# Patient Record
Sex: MALE | Race: WHITE | NOT HISPANIC OR LATINO | Employment: OTHER | URBAN - METROPOLITAN AREA
[De-identification: names, ages, dates, MRNs, and addresses within clinical notes are randomized per-mention and may not be internally consistent; named-entity substitution may affect disease eponyms.]

---

## 2017-05-01 ENCOUNTER — OFFICE VISIT (OUTPATIENT)
Dept: ORTHOPEDIC SURGERY | Facility: CLINIC | Age: 82
End: 2017-05-01

## 2017-05-01 VITALS
HEART RATE: 72 BPM | DIASTOLIC BLOOD PRESSURE: 91 MMHG | HEIGHT: 74 IN | SYSTOLIC BLOOD PRESSURE: 149 MMHG | WEIGHT: 205 LBS | BODY MASS INDEX: 26.31 KG/M2

## 2017-05-01 DIAGNOSIS — S52.572A OTHER CLOSED INTRA-ARTICULAR FRACTURE OF DISTAL END OF LEFT RADIUS, INITIAL ENCOUNTER: Primary | ICD-10-CM

## 2017-05-01 PROCEDURE — 99203 OFFICE O/P NEW LOW 30 MIN: CPT | Performed by: ORTHOPAEDIC SURGERY

## 2017-05-01 RX ORDER — HYDROCODONE BITARTRATE AND ACETAMINOPHEN 7.5; 325 MG/1; MG/1
1 TABLET ORAL EVERY 6 HOURS PRN
Qty: 24 TABLET | Refills: 0 | Status: SHIPPED | OUTPATIENT
Start: 2017-05-01 | End: 2017-05-05

## 2017-05-02 ENCOUNTER — PREP FOR SURGERY (OUTPATIENT)
Dept: OTHER | Facility: HOSPITAL | Age: 82
End: 2017-05-02

## 2017-05-02 ENCOUNTER — PREP FOR SURGERY (OUTPATIENT)
Dept: ORTHOPEDIC SURGERY | Facility: CLINIC | Age: 82
End: 2017-05-02

## 2017-05-02 ENCOUNTER — TELEPHONE (OUTPATIENT)
Dept: ORTHOPEDIC SURGERY | Facility: CLINIC | Age: 82
End: 2017-05-02

## 2017-05-02 ENCOUNTER — APPOINTMENT (OUTPATIENT)
Dept: PREADMISSION TESTING | Facility: HOSPITAL | Age: 82
End: 2017-05-02

## 2017-05-02 ENCOUNTER — HOSPITAL ENCOUNTER (OUTPATIENT)
Dept: GENERAL RADIOLOGY | Facility: HOSPITAL | Age: 82
Discharge: HOME | End: 2017-05-02
Admitting: ORTHOPAEDIC SURGERY

## 2017-05-02 VITALS
BODY MASS INDEX: 26.64 KG/M2 | RESPIRATION RATE: 16 BRPM | DIASTOLIC BLOOD PRESSURE: 73 MMHG | OXYGEN SATURATION: 98 % | WEIGHT: 207.6 LBS | SYSTOLIC BLOOD PRESSURE: 160 MMHG | HEART RATE: 56 BPM | HEIGHT: 74 IN

## 2017-05-02 DIAGNOSIS — S52.572A OTHER CLOSED INTRA-ARTICULAR FRACTURE OF DISTAL END OF LEFT RADIUS, INITIAL ENCOUNTER: Primary | ICD-10-CM

## 2017-05-02 PROCEDURE — 93005 ELECTROCARDIOGRAM TRACING: CPT | Performed by: ORTHOPAEDIC SURGERY

## 2017-05-02 PROCEDURE — 85025 COMPLETE CBC W/AUTO DIFF WBC: CPT | Performed by: ORTHOPAEDIC SURGERY

## 2017-05-02 PROCEDURE — 80048 BASIC METABOLIC PNL TOTAL CA: CPT | Performed by: ORTHOPAEDIC SURGERY

## 2017-05-02 PROCEDURE — 71020 HC CHEST PA AND LATERAL: CPT

## 2017-05-02 RX ORDER — OXYCODONE HCL 10 MG/1
10 TABLET, FILM COATED, EXTENDED RELEASE ORAL ONCE
Status: CANCELLED | OUTPATIENT
Start: 2017-05-03

## 2017-05-02 RX ORDER — MELOXICAM 7.5 MG/1
15 TABLET ORAL ONCE
Status: CANCELLED | OUTPATIENT
Start: 2017-05-03

## 2017-05-02 RX ORDER — PREGABALIN 75 MG/1
150 CAPSULE ORAL ONCE
Status: CANCELLED | OUTPATIENT
Start: 2017-05-03

## 2017-05-02 RX ORDER — ACETAMINOPHEN 10 MG/ML
1000 INJECTION, SOLUTION INTRAVENOUS ONCE
Status: CANCELLED | OUTPATIENT
Start: 2017-05-03

## 2017-05-05 ENCOUNTER — OFFICE VISIT (OUTPATIENT)
Dept: CARDIOLOGY | Facility: CLINIC | Age: 82
End: 2017-05-05

## 2017-05-05 ENCOUNTER — TELEPHONE (OUTPATIENT)
Dept: CARDIOLOGY | Facility: CLINIC | Age: 82
End: 2017-05-05

## 2017-05-05 VITALS
DIASTOLIC BLOOD PRESSURE: 78 MMHG | HEART RATE: 72 BPM | WEIGHT: 207.4 LBS | SYSTOLIC BLOOD PRESSURE: 142 MMHG | HEIGHT: 74 IN | BODY MASS INDEX: 26.62 KG/M2

## 2017-05-05 DIAGNOSIS — Z01.810 ENCOUNTER FOR PRE-OPERATIVE CARDIOVASCULAR CLEARANCE: Primary | ICD-10-CM

## 2017-05-05 DIAGNOSIS — I44.7 LEFT BUNDLE BRANCH BLOCK: ICD-10-CM

## 2017-05-05 DIAGNOSIS — R94.31 ABNORMAL EKG: ICD-10-CM

## 2017-05-05 PROCEDURE — 93000 ELECTROCARDIOGRAM COMPLETE: CPT | Performed by: NURSE PRACTITIONER

## 2017-05-05 PROCEDURE — 99203 OFFICE O/P NEW LOW 30 MIN: CPT | Performed by: NURSE PRACTITIONER

## 2017-05-08 DIAGNOSIS — Z01.810 ENCOUNTER FOR PRE-OPERATIVE CARDIOVASCULAR CLEARANCE: Primary | ICD-10-CM

## 2017-05-09 ENCOUNTER — TELEPHONE (OUTPATIENT)
Dept: ORTHOPEDIC SURGERY | Facility: CLINIC | Age: 82
End: 2017-05-09

## 2017-05-11 ENCOUNTER — TELEPHONE (OUTPATIENT)
Dept: ORTHOPEDIC SURGERY | Facility: CLINIC | Age: 82
End: 2017-05-11

## 2017-05-12 ENCOUNTER — HOSPITAL ENCOUNTER (OUTPATIENT)
Dept: CARDIOLOGY | Facility: HOSPITAL | Age: 82
Discharge: HOME | End: 2017-05-12
Attending: ORTHOPAEDIC SURGERY

## 2017-05-12 ENCOUNTER — HOSPITAL ENCOUNTER (OUTPATIENT)
Dept: NUCLEAR MEDICINE | Facility: HOSPITAL | Age: 82
Discharge: HOME | End: 2017-05-12
Attending: ORTHOPAEDIC SURGERY

## 2017-05-12 ENCOUNTER — HOSPITAL ENCOUNTER (OUTPATIENT)
Dept: CARDIOLOGY | Facility: HOSPITAL | Age: 82
Discharge: HOME | End: 2017-05-12
Attending: ORTHOPAEDIC SURGERY | Admitting: ORTHOPAEDIC SURGERY

## 2017-05-12 VITALS
WEIGHT: 207 LBS | OXYGEN SATURATION: 95 % | HEIGHT: 74 IN | SYSTOLIC BLOOD PRESSURE: 158 MMHG | RESPIRATION RATE: 18 BRPM | DIASTOLIC BLOOD PRESSURE: 74 MMHG | HEART RATE: 61 BPM | BODY MASS INDEX: 26.56 KG/M2

## 2017-05-12 VITALS
HEART RATE: 50 BPM | WEIGHT: 207 LBS | SYSTOLIC BLOOD PRESSURE: 154 MMHG | DIASTOLIC BLOOD PRESSURE: 50 MMHG | HEIGHT: 74 IN | BODY MASS INDEX: 26.56 KG/M2

## 2017-05-12 DIAGNOSIS — Z01.810 ENCOUNTER FOR PRE-OPERATIVE CARDIOVASCULAR CLEARANCE: ICD-10-CM

## 2017-05-12 LAB
AORTIC ARCH: 3 CM
ASCENDING AORTA: 3.1 CM
BH CV ECHO MEAS - ACS: 2 CM
BH CV ECHO MEAS - AO MAX PG (FULL): 1.9 MMHG
BH CV ECHO MEAS - AO MAX PG: 4.6 MMHG
BH CV ECHO MEAS - AO MEAN PG (FULL): 2 MMHG
BH CV ECHO MEAS - AO MEAN PG: 3 MMHG
BH CV ECHO MEAS - AO ROOT AREA (BSA CORRECTED): 1.4
BH CV ECHO MEAS - AO ROOT AREA: 7.5 CM^2
BH CV ECHO MEAS - AO ROOT DIAM: 3.1 CM
BH CV ECHO MEAS - AO V2 MAX: 107 CM/SEC
BH CV ECHO MEAS - AO V2 MEAN: 79.3 CM/SEC
BH CV ECHO MEAS - AO V2 VTI: 26.5 CM
BH CV ECHO MEAS - ASC AORTA: 3.1 CM
BH CV ECHO MEAS - AVA(I,A): 2.2 CM^2
BH CV ECHO MEAS - AVA(I,D): 2.2 CM^2
BH CV ECHO MEAS - AVA(V,A): 2.4 CM^2
BH CV ECHO MEAS - AVA(V,D): 2.4 CM^2
BH CV ECHO MEAS - BSA(HAYCOCK): 2.2 M^2
BH CV ECHO MEAS - BSA: 2.2 M^2
BH CV ECHO MEAS - BZI_BMI: 26.6 KILOGRAMS/M^2
BH CV ECHO MEAS - BZI_METRIC_HEIGHT: 188 CM
BH CV ECHO MEAS - BZI_METRIC_WEIGHT: 93.9 KG
BH CV ECHO MEAS - CONTRAST EF (2CH): 50.1 ML/M^2
BH CV ECHO MEAS - CONTRAST EF 4CH: 42.2 ML/M^2
BH CV ECHO MEAS - EDV(CUBED): 67.4 ML
BH CV ECHO MEAS - EDV(MOD-SP2): 139 ML
BH CV ECHO MEAS - EDV(MOD-SP4): 140 ML
BH CV ECHO MEAS - EDV(TEICH): 72.9 ML
BH CV ECHO MEAS - EF(CUBED): 71.4 %
BH CV ECHO MEAS - EF(MOD-SP2): 50.1 %
BH CV ECHO MEAS - EF(MOD-SP4): 42.2 %
BH CV ECHO MEAS - EF(TEICH): 63.6 %
BH CV ECHO MEAS - ESV(CUBED): 19.2 ML
BH CV ECHO MEAS - ESV(MOD-SP2): 69.3 ML
BH CV ECHO MEAS - ESV(MOD-SP4): 80.9 ML
BH CV ECHO MEAS - ESV(TEICH): 26.5 ML
BH CV ECHO MEAS - FS: 34.2 %
BH CV ECHO MEAS - IVS/LVPW: 1.2
BH CV ECHO MEAS - IVSD: 0.99 CM
BH CV ECHO MEAS - LAT PEAK E' VEL: 5 CM/SEC
BH CV ECHO MEAS - LV DIASTOLIC VOL/BSA (35-75): 63.5 ML/M^2
BH CV ECHO MEAS - LV MASS(C)D: 117.7 GRAMS
BH CV ECHO MEAS - LV MASS(C)DI: 53.4 GRAMS/M^2
BH CV ECHO MEAS - LV MAX PG: 2.6 MMHG
BH CV ECHO MEAS - LV MEAN PG: 1 MMHG
BH CV ECHO MEAS - LV SYSTOLIC VOL/BSA (12-30): 36.7 ML/M^2
BH CV ECHO MEAS - LV V1 MAX: 81.3 CM/SEC
BH CV ECHO MEAS - LV V1 MEAN: 53.8 CM/SEC
BH CV ECHO MEAS - LV V1 VTI: 18.4 CM
BH CV ECHO MEAS - LVIDD: 4.1 CM
BH CV ECHO MEAS - LVIDS: 2.7 CM
BH CV ECHO MEAS - LVLD AP2: 10.2 CM
BH CV ECHO MEAS - LVLD AP4: 10.5 CM
BH CV ECHO MEAS - LVLS AP2: 9.1 CM
BH CV ECHO MEAS - LVLS AP4: 8.8 CM
BH CV ECHO MEAS - LVOT AREA (M): 3.1 CM^2
BH CV ECHO MEAS - LVOT AREA: 3.1 CM^2
BH CV ECHO MEAS - LVOT DIAM: 2 CM
BH CV ECHO MEAS - LVPWD: 0.86 CM
BH CV ECHO MEAS - MED PEAK E' VEL: 7 CM/SEC
BH CV ECHO MEAS - MV A DUR: 0.11 SEC
BH CV ECHO MEAS - MV A MAX VEL: 113 CM/SEC
BH CV ECHO MEAS - MV DEC SLOPE: 349 CM/SEC^2
BH CV ECHO MEAS - MV DEC TIME: 0.18 SEC
BH CV ECHO MEAS - MV E MAX VEL: 45.6 CM/SEC
BH CV ECHO MEAS - MV E/A: 0.4
BH CV ECHO MEAS - MV MAX PG: 3.7 MMHG
BH CV ECHO MEAS - MV MEAN PG: 1 MMHG
BH CV ECHO MEAS - MV P1/2T MAX VEL: 61.1 CM/SEC
BH CV ECHO MEAS - MV P1/2T: 51.3 MSEC
BH CV ECHO MEAS - MV V2 MAX: 96.8 CM/SEC
BH CV ECHO MEAS - MV V2 MEAN: 36.5 CM/SEC
BH CV ECHO MEAS - MV V2 VTI: 27.4 CM
BH CV ECHO MEAS - MVA P1/2T LCG: 3.6 CM^2
BH CV ECHO MEAS - MVA(P1/2T): 4.3 CM^2
BH CV ECHO MEAS - MVA(VTI): 2.1 CM^2
BH CV ECHO MEAS - PA ACC TIME: 0.09 SEC
BH CV ECHO MEAS - PA MAX PG (FULL): 3.2 MMHG
BH CV ECHO MEAS - PA MAX PG: 4.9 MMHG
BH CV ECHO MEAS - PA PR(ACCEL): 40.8 MMHG
BH CV ECHO MEAS - PA V2 MAX: 111 CM/SEC
BH CV ECHO MEAS - PULM A REVS DUR: 0.1 SEC
BH CV ECHO MEAS - PULM A REVS VEL: 40.7 CM/SEC
BH CV ECHO MEAS - PULM DIAS VEL: 28.6 CM/SEC
BH CV ECHO MEAS - PULM S/D: 1.9
BH CV ECHO MEAS - PULM SYS VEL: 55.8 CM/SEC
BH CV ECHO MEAS - PVA(V,A): 1.7 CM^2
BH CV ECHO MEAS - PVA(V,D): 1.7 CM^2
BH CV ECHO MEAS - QP/QS: 0.69
BH CV ECHO MEAS - RAP SYSTOLE: 3 MMHG
BH CV ECHO MEAS - RV MAX PG: 1.7 MMHG
BH CV ECHO MEAS - RV MEAN PG: 1 MMHG
BH CV ECHO MEAS - RV V1 MAX: 65.8 CM/SEC
BH CV ECHO MEAS - RV V1 MEAN: 45.4 CM/SEC
BH CV ECHO MEAS - RV V1 VTI: 14 CM
BH CV ECHO MEAS - RVOT AREA: 2.8 CM^2
BH CV ECHO MEAS - RVOT DIAM: 1.9 CM
BH CV ECHO MEAS - RVSP: 36 MMHG
BH CV ECHO MEAS - SI(AO): 90.7 ML/M^2
BH CV ECHO MEAS - SI(CUBED): 21.8 ML/M^2
BH CV ECHO MEAS - SI(LVOT): 26.2 ML/M^2
BH CV ECHO MEAS - SI(MOD-SP2): 31.6 ML/M^2
BH CV ECHO MEAS - SI(MOD-SP4): 26.8 ML/M^2
BH CV ECHO MEAS - SI(TEICH): 21 ML/M^2
BH CV ECHO MEAS - SV(AO): 200 ML
BH CV ECHO MEAS - SV(CUBED): 48.2 ML
BH CV ECHO MEAS - SV(LVOT): 57.8 ML
BH CV ECHO MEAS - SV(MOD-SP2): 69.7 ML
BH CV ECHO MEAS - SV(MOD-SP4): 59.1 ML
BH CV ECHO MEAS - SV(RVOT): 39.7 ML
BH CV ECHO MEAS - SV(TEICH): 46.4 ML
BH CV ECHO MEAS - TAPSE (>1.6): 2.6 CM2
BH CV ECHO MEAS - TR MAX VEL: 289 CM/SEC
BH CV NUCLEAR PRIOR STUDY: 3
BH CV STRESS BP STAGE 1: NORMAL
BH CV STRESS BP STAGE 2: NORMAL
BH CV STRESS BP STAGE 3: NORMAL
BH CV STRESS COMMENTS STAGE 1: NORMAL
BH CV STRESS COMMENTS STAGE 2: NORMAL
BH CV STRESS DOSE REGADENOSON STAGE 1: 0.4
BH CV STRESS DURATION MIN STAGE 1: 0
BH CV STRESS DURATION MIN STAGE 2: 4
BH CV STRESS DURATION SEC STAGE 1: 15
BH CV STRESS DURATION SEC STAGE 2: 0
BH CV STRESS HR STAGE 1: 75
BH CV STRESS HR STAGE 2: 75
BH CV STRESS HR STAGE 3: 69
BH CV STRESS O2 STAGE 1: 96
BH CV STRESS O2 STAGE 2: 96
BH CV STRESS O2 STAGE 3: 96
BH CV STRESS PROTOCOL 1: NORMAL
BH CV STRESS RECOVERY BP: NORMAL MMHG
BH CV STRESS RECOVERY HR: 71 BPM
BH CV STRESS STAGE 1: 1
BH CV STRESS STAGE 2: 2
BH CV STRESS STAGE 3: 3
BH CV XLRA - RV BASE: 3.7 CM
BH CV XLRA - TDI S': 12 CM/SEC
E/E' RATIO: 8
LEFT ATRIUM VOLUME INDEX: 19 ML/M2
LV EF NUC BP: 50 %
MAXIMAL PREDICTED HEART RATE: 141 BPM
PERCENT MAX PREDICTED HR: 65.25 %
STRESS BASELINE BP: NORMAL MMHG
STRESS BASELINE HR: 59 BPM
STRESS O2 SAT REST: 95 %
STRESS PERCENT HR: 77 %
STRESS POST ESTIMATED WORKLOAD: 1 METS
STRESS POST EXERCISE DUR SEC: 30 SEC
STRESS POST PEAK BP: NORMAL MMHG
STRESS POST PEAK HR: 92 BPM
STRESS TARGET HR: 120 BPM

## 2017-05-12 PROCEDURE — 0 TECHNETIUM SESTAMIBI: Performed by: ORTHOPAEDIC SURGERY

## 2017-05-12 PROCEDURE — 93016 CV STRESS TEST SUPVJ ONLY: CPT | Performed by: INTERNAL MEDICINE

## 2017-05-12 PROCEDURE — 25010000002 REGADENOSON 0.4 MG/5ML SOLUTION: Performed by: ORTHOPAEDIC SURGERY

## 2017-05-12 PROCEDURE — A9500 TC99M SESTAMIBI: HCPCS | Performed by: ORTHOPAEDIC SURGERY

## 2017-05-12 PROCEDURE — 93306 TTE W/DOPPLER COMPLETE: CPT | Performed by: INTERNAL MEDICINE

## 2017-05-12 PROCEDURE — 78452 HT MUSCLE IMAGE SPECT MULT: CPT

## 2017-05-12 PROCEDURE — 93017 CV STRESS TEST TRACING ONLY: CPT

## 2017-05-12 PROCEDURE — 0399T ADULT TRANSTHORACIC ECHO COMPLETE WITH CONTRAST: CPT | Performed by: INTERNAL MEDICINE

## 2017-05-12 PROCEDURE — 78452 HT MUSCLE IMAGE SPECT MULT: CPT | Performed by: INTERNAL MEDICINE

## 2017-05-12 PROCEDURE — C8929 TTE W OR WO FOL WCON,DOPPLER: HCPCS

## 2017-05-12 PROCEDURE — 0399T HC MYOCARDL STRAIN IMAG QUAN ASSMT PER SESS: CPT

## 2017-05-12 PROCEDURE — 93018 CV STRESS TEST I&R ONLY: CPT | Performed by: INTERNAL MEDICINE

## 2017-05-12 PROCEDURE — 25010000002 PERFLUTREN (DEFINITY) 8.476 MG IN SODIUM CHLORIDE 10 ML INJECTION: Performed by: ORTHOPAEDIC SURGERY

## 2017-05-12 RX ADMIN — Medication 1 DOSE: at 07:50

## 2017-05-12 RX ADMIN — Medication 1 DOSE: at 10:00

## 2017-05-12 RX ADMIN — REGADENOSON 0.4 MG: 0.08 INJECTION, SOLUTION INTRAVENOUS at 10:00

## 2017-05-12 RX ADMIN — PERFLUTREN 3 ML: 6.52 INJECTION, SUSPENSION INTRAVENOUS at 11:32

## 2017-05-15 ENCOUNTER — PREP FOR SURGERY (OUTPATIENT)
Dept: ORTHOPEDIC SURGERY | Facility: CLINIC | Age: 82
End: 2017-05-15

## 2017-07-13 ENCOUNTER — TELEPHONE (OUTPATIENT)
Dept: CARDIOLOGY | Facility: CLINIC | Age: 82
End: 2017-07-13

## 2017-07-13 NOTE — TELEPHONE ENCOUNTER
----- Message from YESI Escalante sent at 5/19/2017  5:34 PM EDT -----    Remind patient to follow-up with Dr. Shaquille Munoz

## 2017-07-13 NOTE — TELEPHONE ENCOUNTER
Mr. Asher has been recommended to follow-up with Dr. Shaquille Munoz for abnormal stress test in the past.  The patient was called on 5/18/17 to make a follow-up appointment Dr. Shaquille Munoz at the [facility] and declined.    Ivon-please call patient one more time and recommend that he be seen in the office by Dr. Shaquille Munoz and please schedule appointment.  Thank you

## 2018-10-23 ENCOUNTER — APPOINTMENT (OUTPATIENT)
Dept: GENERAL RADIOLOGY | Facility: HOSPITAL | Age: 83
End: 2018-10-23

## 2018-10-23 ENCOUNTER — HOSPITAL ENCOUNTER (EMERGENCY)
Facility: HOSPITAL | Age: 83
Discharge: HOME | End: 2018-10-23
Attending: EMERGENCY MEDICINE | Admitting: EMERGENCY MEDICINE

## 2018-10-23 VITALS
SYSTOLIC BLOOD PRESSURE: 134 MMHG | OXYGEN SATURATION: 99 % | RESPIRATION RATE: 18 BRPM | HEART RATE: 74 BPM | HEIGHT: 74 IN | WEIGHT: 202 LBS | BODY MASS INDEX: 25.93 KG/M2 | DIASTOLIC BLOOD PRESSURE: 72 MMHG | TEMPERATURE: 98.4 F

## 2018-10-23 DIAGNOSIS — S52.614A CLOSED NONDISPLACED FRACTURE OF STYLOID PROCESS OF RIGHT ULNA, INITIAL ENCOUNTER: Primary | ICD-10-CM

## 2018-10-23 PROCEDURE — 73110 X-RAY EXAM OF WRIST: CPT

## 2018-10-23 PROCEDURE — 99283 EMERGENCY DEPT VISIT LOW MDM: CPT

## 2018-10-23 PROCEDURE — 99284 EMERGENCY DEPT VISIT MOD MDM: CPT | Performed by: EMERGENCY MEDICINE

## 2018-10-23 NOTE — ED PROVIDER NOTES
Subjective     History provided by:  Patient and spouse    History of Present Illness    · Chief complaint: Wrist injury    · Location: Right wrist    · Quality/Severity: Swelling and pain involving the right wrist.    · Timing/Onset: The patient fell when he got out of bed [place] night 36 hours ago.    · Modifying Factors: Movement of his right wrist exacerbates pain.    · Associated symptoms: He has swelling of the right wrist and hand.    · Narrative: The patient is an 82-year-old white male who states he got out of bed [place] night and was sleepy and stumbled and fell injuring his right wrist.  It continues to be swollen and painful, so his wife made him come in.    ED Triage Vitals   Temp Heart Rate Resp BP SpO2   10/23/18 1427 10/23/18 1427 10/23/18 1427 10/23/18 1427 10/23/18 1427   98.4 °F (36.9 °C) 71 18 166/81 96 %      Temp src Heart Rate Source Patient Position BP Location FiO2 (%)   10/23/18 1609 10/23/18 1609 10/23/18 1609 10/23/18 1609 --   Oral Monitor Lying Right arm        Review of Systems   Constitutional: Negative for activity change, appetite change, chills, diaphoresis, fatigue and fever.   HENT: Negative for congestion, dental problem, ear pain, hearing loss, mouth sores, postnasal drip, rhinorrhea, sinus pressure, sore throat and voice change.    Eyes: Negative for photophobia, pain, discharge, redness and visual disturbance.   Respiratory: Negative for cough, chest tightness, shortness of breath, wheezing and stridor.    Cardiovascular: Negative for chest pain, palpitations and leg swelling.   Gastrointestinal: Negative for abdominal pain, diarrhea, nausea and vomiting.   Genitourinary: Negative for difficulty urinating, dysuria, flank pain, frequency, hematuria and urgency.   Musculoskeletal: Negative for arthralgias, back pain, gait problem, joint swelling, myalgias, neck pain and neck stiffness.   Skin: Negative for color change and rash.   Neurological: Negative for dizziness, tremors,  seizures, syncope, facial asymmetry, speech difficulty, weakness, light-headedness, numbness and headaches.   Hematological: Negative for adenopathy.   Psychiatric/Behavioral: Negative.  Negative for confusion and decreased concentration. The patient is not nervous/anxious.        Past Medical History:   Diagnosis Date   • Fracture of wrist     left, sched ORIF   • Whooping cough     as a child    • Wrist fracture        No Known Allergies    Past Surgical History:   Procedure Laterality Date   • FRACTURE SURGERY     • NO PAST SURGERIES         Family History   Problem Relation Age of Onset   • Cancer Father        Social History     Social History   • Marital status:      Social History Main Topics   • Smoking status: Former Smoker     Packs/day: 0.25     Types: Cigarettes     Quit date: 1967   • Smokeless tobacco: Never Used   • Alcohol use 1.8 - 2.4 oz/week     3 - 4 Cans of beer per week      Comment: PER WEEK    • Drug use: No   • Sexual activity: Defer     Other Topics Concern   • Not on file           Objective   Physical Exam   Constitutional: He is oriented to person, place, and time. He appears well-developed and well-nourished. No distress.   The patient appears healthy in no acute distress.  Review of his vital signs: His blood pressure slightly elevated 166/81, otherwise vital signs within normal limits.   HENT:   Head: Normocephalic and atraumatic.   Eyes: Pupils are equal, round, and reactive to light. Conjunctivae are normal.   Neck: Normal range of motion.   Musculoskeletal:   The patient's right wrist is swollen with ecchymosis and mild diffuse tenderness especially over the ulnar styloid.  There is no stock box tenderness.  No obvious deformity of the wrist.  Range of motion of the wrist causes discomfort.  He has swelling over the dorsal aspect of the right hand, there is no bony tenderness or deformity of the metacarpals of the right hand.  He has full range of motion in all of his  fingers which are neurovascularly intact and appear atraumatic.   Neurological: He is alert and oriented to person, place, and time. No cranial nerve deficit.   No focal motor sensory deficit.   Skin: Skin is warm and dry. Capillary refill takes less than 2 seconds. No rash noted. He is not diaphoretic. No erythema.   Psychiatric: He has a normal mood and affect. His behavior is normal. Judgment and thought content normal.   Nursing note and vitals reviewed.      Procedures           ED Course  ED Course as of Oct 23 1741   Tue Oct 23, 2018   1522 [facility] Report [id number] is blank  [TP]   1739 The patient has a nondisplaced ulnar styloid fracture of the right wrist, otherwise it was negative for fracture.  [TP]   1740 I placed the patient's right wrist, hand, and forearm and an ulnar gutter splint using 6 inch Ortho-Glass.  His right hand was neurovascularly intact after placement.  [TP]   1740 The patient requested to be referred to [facility].  [TP]      ED Course User Index  [TP] Adolfo Fleming MD                  MDM  Number of Diagnoses or Management Options  Closed nondisplaced fracture of styloid process of right ulna, initial encounter: new and requires workup     Amount and/or Complexity of Data Reviewed  Tests in the radiology section of CPT®: ordered and reviewed  Independent visualization of images, tracings, or specimens: yes    Patient Progress  Patient progress: stable        Final diagnoses:   Closed nondisplaced fracture of styloid process of right ulna, initial encounter           Labs Reviewed - No data to display  XR Wrist 3+ View Right   ED Interpretation   The patient is a nondisplaced ulnar styloid fracture of the right wrist.  I see no other fractures.      Final Result   Nondisplaced ulnar styloid fracture.       This report was finalized on 10/23/2018 2:58 PM by Dr. Solis Johnson MD.                 Medication List      No changes were made to your prescriptions  during this visit.            Adolfo Fleming MD  10/23/18 [id number]

## 2019-04-24 ENCOUNTER — HOSPITAL ENCOUNTER (OUTPATIENT)
Dept: GENERAL RADIOLOGY | Facility: HOSPITAL | Age: 84
Discharge: HOME | End: 2019-04-24

## 2019-04-24 ENCOUNTER — HOSPITAL ENCOUNTER (OUTPATIENT)
Dept: GENERAL RADIOLOGY | Facility: HOSPITAL | Age: 84
Discharge: HOME | End: 2019-04-24
Admitting: ORTHOPAEDIC SURGERY

## 2019-04-24 ENCOUNTER — APPOINTMENT (OUTPATIENT)
Dept: PREADMISSION TESTING | Facility: HOSPITAL | Age: 84
End: 2019-04-24

## 2019-04-24 VITALS
HEIGHT: 74 IN | SYSTOLIC BLOOD PRESSURE: 157 MMHG | DIASTOLIC BLOOD PRESSURE: 74 MMHG | TEMPERATURE: 98.5 F | RESPIRATION RATE: 16 BRPM | WEIGHT: 205.8 LBS | BODY MASS INDEX: 26.41 KG/M2 | OXYGEN SATURATION: 98 % | HEART RATE: 61 BPM

## 2019-04-24 LAB
ABO GROUP BLD: NORMAL
ALBUMIN SERPL-MCNC: 4.4 G/DL (ref 3.5–5.2)
ALBUMIN/GLOB SERPL: 1.5 G/DL
ALP SERPL-CCNC: 77 U/L (ref 39–117)
ALT SERPL W P-5'-P-CCNC: 17 U/L (ref 1–41)
ANION GAP SERPL CALCULATED.3IONS-SCNC: 14.1 MMOL/L
APTT PPP: 29.5 SECONDS (ref 22.7–35.4)
AST SERPL-CCNC: 27 U/L (ref 1–40)
BACTERIA UR QL AUTO: NORMAL /HPF
BASOPHILS # BLD AUTO: 0.07 10*3/MM3 (ref 0–0.2)
BASOPHILS NFR BLD AUTO: 1.1 % (ref 0–1.5)
BILIRUB SERPL-MCNC: 0.7 MG/DL (ref 0.2–1.2)
BILIRUB UR QL STRIP: NEGATIVE
BLD GP AB SCN SERPL QL: NEGATIVE
BUN BLD-MCNC: 18 MG/DL (ref 8–23)
BUN/CREAT SERPL: 17.5 (ref 7–25)
CALCIUM SPEC-SCNC: 9 MG/DL (ref 8.6–10.5)
CHLORIDE SERPL-SCNC: 100 MMOL/L (ref 98–107)
CLARITY UR: CLEAR
CO2 SERPL-SCNC: 25.9 MMOL/L (ref 22–29)
COLOR UR: YELLOW
CREAT BLD-MCNC: 1.03 MG/DL (ref 0.76–1.27)
DEPRECATED RDW RBC AUTO: 45.2 FL (ref 37–54)
EOSINOPHIL # BLD AUTO: 0.23 10*3/MM3 (ref 0–0.4)
EOSINOPHIL NFR BLD AUTO: 3.7 % (ref 0.3–6.2)
ERYTHROCYTE [DISTWIDTH] IN BLOOD BY AUTOMATED COUNT: 13.1 % (ref 12.3–15.4)
GFR SERPL CREATININE-BSD FRML MDRD: 69 ML/MIN/1.73
GLOBULIN UR ELPH-MCNC: 2.9 GM/DL
GLUCOSE BLD-MCNC: 99 MG/DL (ref 65–99)
GLUCOSE UR STRIP-MCNC: NEGATIVE MG/DL
HCT VFR BLD AUTO: 45.8 % (ref 37.5–51)
HGB BLD-MCNC: 14.9 G/DL (ref 13–17.7)
HGB UR QL STRIP.AUTO: NEGATIVE
HYALINE CASTS UR QL AUTO: NORMAL /LPF
IMM GRANULOCYTES # BLD AUTO: 0.02 10*3/MM3 (ref 0–0.05)
IMM GRANULOCYTES NFR BLD AUTO: 0.3 % (ref 0–0.5)
INR PPP: 1.06 (ref 0.9–1.1)
KETONES UR QL STRIP: NEGATIVE
LEUKOCYTE ESTERASE UR QL STRIP.AUTO: NEGATIVE
LYMPHOCYTES # BLD AUTO: 1.72 10*3/MM3 (ref 0.7–3.1)
LYMPHOCYTES NFR BLD AUTO: 27.3 % (ref 19.6–45.3)
MCH RBC QN AUTO: 30.5 PG (ref 26.6–33)
MCHC RBC AUTO-ENTMCNC: 32.5 G/DL (ref 31.5–35.7)
MCV RBC AUTO: 93.9 FL (ref 79–97)
MONOCYTES # BLD AUTO: 0.72 10*3/MM3 (ref 0.1–0.9)
MONOCYTES NFR BLD AUTO: 11.4 % (ref 5–12)
NEUTROPHILS # BLD AUTO: 3.54 10*3/MM3 (ref 1.7–7)
NEUTROPHILS NFR BLD AUTO: 56.2 % (ref 42.7–76)
NITRITE UR QL STRIP: NEGATIVE
NRBC BLD AUTO-RTO: 0 /100 WBC (ref 0–0.2)
PH UR STRIP.AUTO: 5.5 [PH] (ref 5–8)
PLATELET # BLD AUTO: 225 10*3/MM3 (ref 140–450)
PMV BLD AUTO: 9.7 FL (ref 6–12)
POTASSIUM BLD-SCNC: 4 MMOL/L (ref 3.5–5.2)
PROT SERPL-MCNC: 7.3 G/DL (ref 6–8.5)
PROT UR QL STRIP: NEGATIVE
PROTHROMBIN TIME: 13.5 SECONDS (ref 11.7–14.2)
RBC # BLD AUTO: 4.88 10*6/MM3 (ref 4.14–5.8)
RBC # UR: NORMAL /HPF
REF LAB TEST METHOD: NORMAL
RH BLD: POSITIVE
SODIUM BLD-SCNC: 140 MMOL/L (ref 136–145)
SP GR UR STRIP: 1.02 (ref 1–1.03)
SQUAMOUS #/AREA URNS HPF: NORMAL /HPF
T&S EXPIRATION DATE: NORMAL
UROBILINOGEN UR QL STRIP: NORMAL
WBC NRBC COR # BLD: 6.3 10*3/MM3 (ref 3.4–10.8)
WBC UR QL AUTO: NORMAL /HPF

## 2019-04-24 PROCEDURE — 81001 URINALYSIS AUTO W/SCOPE: CPT | Performed by: ORTHOPAEDIC SURGERY

## 2019-04-24 PROCEDURE — 73502 X-RAY EXAM HIP UNI 2-3 VIEWS: CPT

## 2019-04-24 PROCEDURE — 85025 COMPLETE CBC W/AUTO DIFF WBC: CPT | Performed by: ORTHOPAEDIC SURGERY

## 2019-04-24 PROCEDURE — 93005 ELECTROCARDIOGRAM TRACING: CPT

## 2019-04-24 PROCEDURE — 80053 COMPREHEN METABOLIC PANEL: CPT | Performed by: ORTHOPAEDIC SURGERY

## 2019-04-24 PROCEDURE — 85610 PROTHROMBIN TIME: CPT | Performed by: ORTHOPAEDIC SURGERY

## 2019-04-24 PROCEDURE — 71046 X-RAY EXAM CHEST 2 VIEWS: CPT

## 2019-04-24 PROCEDURE — 85730 THROMBOPLASTIN TIME PARTIAL: CPT | Performed by: ORTHOPAEDIC SURGERY

## 2019-04-24 PROCEDURE — 86900 BLOOD TYPING SEROLOGIC ABO: CPT | Performed by: ORTHOPAEDIC SURGERY

## 2019-04-24 PROCEDURE — 86901 BLOOD TYPING SEROLOGIC RH(D): CPT | Performed by: ORTHOPAEDIC SURGERY

## 2019-04-24 PROCEDURE — 93010 ELECTROCARDIOGRAM REPORT: CPT | Performed by: INTERNAL MEDICINE

## 2019-04-24 PROCEDURE — 36415 COLL VENOUS BLD VENIPUNCTURE: CPT

## 2019-04-24 PROCEDURE — 86850 RBC ANTIBODY SCREEN: CPT | Performed by: ORTHOPAEDIC SURGERY

## 2019-04-24 RX ORDER — NAPROXEN SODIUM 220 MG
220 TABLET ORAL 2 TIMES DAILY PRN
COMMUNITY

## 2019-04-24 ASSESSMENT — HOOS JR
HOOS JR SCORE: 52.965
HOOS JR SCORE: 12

## 2019-04-24 NOTE — DISCHARGE INSTRUCTIONS
Take the following medications the morning of surgery with a small sip of water:  NONE    PLEASE ARRIVE AT THE HOSPITAL AT THE TIME PROVIDED TO YOU BY DR. GALVEZ'S OFFICE    General Instructions:  • Do not eat solid food after midnight the night before surgery.  • You may drink clear liquids day of surgery but must stop at least one hour before your hospital arrival time.  • It is beneficial for you to have a clear drink that contains carbohydrates the day of surgery.  We suggest a 12 to 20 ounce bottle of Gatorade or Powerade for non-diabetic patients or a 12 to 20 ounce bottle of G2 or Powerade Zero for diabetic patients. (Pediatric patients, are not advised to drink a 12 to 20 ounce carbohydrate drink)    Clear liquids are liquids you can see through.  Nothing red in color.     Plain water                               Sports drinks  Sodas                                   Gelatin (Jell-O)  Fruit juices without pulp such as white grape juice and apple juice  Popsicles that contain no fruit or yogurt  Tea or coffee (no cream or milk added)  Gatorade / Powerade  G2 / Powerade Zero    • Infants may have breast milk up to four hours before surgery.  • Infants drinking formula may drink formula up to six hours before surgery.   • Patients who avoid smoking, chewing tobacco and alcohol for 4 weeks prior to surgery have a reduced risk of post-operative complications.  Quit smoking as many days before surgery as you can.  • Do not smoke, use chewing tobacco or drink alcohol the day of surgery.   • If applicable bring your C-PAP/ BI-PAP machine.  • Bring any papers given to you in the doctor’s office.  • Wear clean comfortable clothes and socks.  • Do not wear contact lenses, false eyelashes or make-up.  Bring a case for your glasses.   • Bring crutches or walker if applicable.  • Remove all piercings.  Leave jewelry and any other valuables at home.  • Hair extensions with metal clips must be removed prior to  surgery.  • The Pre-Admission Testing nurse will instruct you to bring medications if unable to obtain an accurate list in Pre-Admission Testing.        If you were given a blood bank ID arm band remember to bring it with you the day of surgery.    Preventing a Surgical Site Infection:  • For 2 to 3 days before surgery, avoid shaving with a razor because the razor can irritate skin and make it easier to develop an infection.    • Any areas of open skin can increase the risk of a post-operative wound infection by allowing bacteria to enter and travel throughout the body.  Notify your surgeon if you have any skin wounds / rashes even if it is not near the expected surgical site.  The area will need assessed to determine if surgery should be delayed until it is healed.  • The night prior to surgery sleep in a clean bed with clean clothing.  Do not allow pets to sleep with you.  • Shower on the morning of surgery using a fresh bar of anti-bacterial soap (such as Dial) and clean washcloth.  Dry with a clean towel and dress in clean clothing.  • Ask your surgeon if you will be receiving antibiotics prior to surgery.  • Make sure you, your family, and all healthcare providers clean their hands with soap and water or an alcohol based hand  before caring for you or your wound.    Day of surgery:  Upon arrival, a Pre-op nurse and Anesthesiologist will review your health history, obtain vital signs, and answer questions you may have.  The only belongings needed at this time will be your home medications and if applicable your C-PAP/BI-PAP machine.  If you are staying overnight your family can leave the rest of your belongings in the car and bring them to your room later.  A Pre-op nurse will start an IV and you may receive medication in preparation for surgery, including something to help you relax.  Your family will be able to see you in the Pre-op area.  While you are in surgery your family should notify the waiting  room  if they leave the waiting room area and provide a contact phone number.        2% CHLORAHEXIDINE GLUCONATE* CLOTH  Preparing or “prepping” skin before surgery can reduce the risk of infection at the surgical site. To make the process easier, [facility] has chosen disposable cloths moistened with a rinse-free, 2% Chlorhexidine Gluconate (CHG) antiseptic solution. The steps below outline the prepping process and should be carefully followed.        Use the prep cloth on the area that is circled in the diagram             Directions Night before Surgery  1) Shower using a fresh bar of anti-bacterial soap (such as Dial) and clean washcloth.  Use a clean towel to completely dry your skin.  2) Do not use any lotions, oils or creams on your skin.  3) Open the package and remove 1 cloth, wipe your skin for 30 seconds in a circular motion.  Allow to dry for 3 minutes.  4) Repeat #3 with second cloth.  5) Do not touch your eyes, ears, or mouth with the prep cloth.  6) Allow the wet prep solution to air dry.  7) Discard the prep cloth and wash your hands with soap and water.   8) Dress in clean bed clothes and sleep on fresh clean bed sheets.   9) You may experience some temporary itching after the prep.    Directions Day of Surgery  1) Repeat steps 1,2,3,4,5,6,7, and 9.   2) Dress in clean clothes before coming to the hospital.    BACTROBAN NASAL OINTMENT  There are many germs normally in your nose. Bactroban is an ointment that will help reduce these germs. Please follow these instructions for Bactroban use:      ____The day before surgery in the morning  Date________    ____The day before surgery in the evening              Date________    ____The day of surgery in the morning    Date________    **Squirt ½ package of Bactroban Ointment onto a cotton applicator and apply to inside of 1st nostril.  Squirt the remaining Bactroban and apply to the inside of the other nostril.    Please be aware  that surgery does come with discomfort.  We want to make every effort to control your discomfort so please discuss any uncontrolled symptoms with your nurse.   Your doctor will most likely have prescribed pain medications.      If you are going home after surgery you will receive individualized written care instructions before being discharged.  A responsible adult must drive you to and from the hospital on the day of your surgery and stay with you for 24 hours.    If you are staying overnight following surgery, you will be transported to your hospital room following the recovery period.  [facility] has all private rooms.    You have received a list of surgical assistants for your reference.  If you have any questions please call Pre-Admission Testing at [phone].  Deductibles and co-payments are collected on the day of service. Please be prepared to pay the required co-pay, deductible or deposit on the day of service as defined by your plan.

## 2019-04-29 ENCOUNTER — ANESTHESIA EVENT (OUTPATIENT)
Dept: PERIOP | Facility: HOSPITAL | Age: 84
End: 2019-04-29

## 2019-04-29 ENCOUNTER — APPOINTMENT (OUTPATIENT)
Dept: GENERAL RADIOLOGY | Facility: HOSPITAL | Age: 84
End: 2019-04-29

## 2019-04-29 ENCOUNTER — ANESTHESIA (OUTPATIENT)
Dept: PERIOP | Facility: HOSPITAL | Age: 84
End: 2019-04-29

## 2019-04-29 ENCOUNTER — HOSPITAL ENCOUNTER (INPATIENT)
Facility: HOSPITAL | Age: 84
LOS: 2 days | Discharge: HOME HEALTH | End: 2019-05-01
Attending: ORTHOPAEDIC SURGERY | Admitting: ORTHOPAEDIC SURGERY

## 2019-04-29 DIAGNOSIS — M16.12 PRIMARY OSTEOARTHRITIS OF LEFT HIP: ICD-10-CM

## 2019-04-29 DIAGNOSIS — R26.2 DIFFICULTY WALKING: Primary | ICD-10-CM

## 2019-04-29 PROBLEM — M16.9 DEGENERATIVE JOINT DISEASE (DJD) OF HIP: Status: ACTIVE | Noted: 2019-04-29

## 2019-04-29 PROCEDURE — C1776 JOINT DEVICE (IMPLANTABLE): HCPCS | Performed by: ORTHOPAEDIC SURGERY

## 2019-04-29 PROCEDURE — 25010000002 ONDANSETRON PER 1 MG: Performed by: NURSE ANESTHETIST, CERTIFIED REGISTERED

## 2019-04-29 PROCEDURE — 0SRB0JA REPLACEMENT OF LEFT HIP JOINT WITH SYNTHETIC SUBSTITUTE, UNCEMENTED, OPEN APPROACH: ICD-10-PCS | Performed by: ORTHOPAEDIC SURGERY

## 2019-04-29 PROCEDURE — 25010000002 FENTANYL CITRATE (PF) 100 MCG/2ML SOLUTION: Performed by: NURSE ANESTHETIST, CERTIFIED REGISTERED

## 2019-04-29 PROCEDURE — 25010000003 CEFAZOLIN IN DEXTROSE 2-4 GM/100ML-% SOLUTION: Performed by: NURSE ANESTHETIST, CERTIFIED REGISTERED

## 2019-04-29 PROCEDURE — 25010000002 VANCOMYCIN 10 G RECONSTITUTED SOLUTION: Performed by: ORTHOPAEDIC SURGERY

## 2019-04-29 PROCEDURE — 25010000002 PROPOFOL 10 MG/ML EMULSION: Performed by: NURSE ANESTHETIST, CERTIFIED REGISTERED

## 2019-04-29 PROCEDURE — 25010000002 MIDAZOLAM PER 1 MG: Performed by: ANESTHESIOLOGY

## 2019-04-29 PROCEDURE — 97110 THERAPEUTIC EXERCISES: CPT

## 2019-04-29 PROCEDURE — C9290 INJ, BUPIVACAINE LIPOSOME: HCPCS | Performed by: ORTHOPAEDIC SURGERY

## 2019-04-29 PROCEDURE — 25010000002 KETOROLAC TROMETHAMINE PER 15 MG: Performed by: ORTHOPAEDIC SURGERY

## 2019-04-29 PROCEDURE — 25010000002 HYDROMORPHONE PER 4 MG: Performed by: NURSE ANESTHETIST, CERTIFIED REGISTERED

## 2019-04-29 PROCEDURE — 25010000003 BUPIVACAINE LIPOSOME 1.3 % SUSPENSION 20 ML VIAL: Performed by: ORTHOPAEDIC SURGERY

## 2019-04-29 PROCEDURE — 97161 PT EVAL LOW COMPLEX 20 MIN: CPT

## 2019-04-29 PROCEDURE — 25010000003 CEFAZOLIN IN DEXTROSE 2-4 GM/100ML-% SOLUTION: Performed by: ORTHOPAEDIC SURGERY

## 2019-04-29 PROCEDURE — 25010000002 DEXAMETHASONE PER 1 MG: Performed by: NURSE ANESTHETIST, CERTIFIED REGISTERED

## 2019-04-29 PROCEDURE — 73501 X-RAY EXAM HIP UNI 1 VIEW: CPT

## 2019-04-29 DEVICE — IMPLANTABLE DEVICE: Type: IMPLANTABLE DEVICE | Status: FUNCTIONAL

## 2019-04-29 DEVICE — R3 3 HOLE ACETABULAR SHELL 58MM
Type: IMPLANTABLE DEVICE | Site: HIP | Status: FUNCTIONAL
Brand: R3 ACETABULAR

## 2019-04-29 DEVICE — OXINIUM FEMORAL HEAD 12/14 TAPER                                    36 MM M/+4
Type: IMPLANTABLE DEVICE | Site: HIP | Status: FUNCTIONAL
Brand: OXINIUM

## 2019-04-29 DEVICE — R3 20 DEGREE XLPE ACETABULAR LINER                                    36MM INNER DIAMETER X OUTER DIAMETER 58MM
Type: IMPLANTABLE DEVICE | Site: HIP | Status: FUNCTIONAL
Brand: R3

## 2019-04-29 DEVICE — POLARSTEM LATERAL NON-CEMENTED                                    WITH TI/HA 7
Type: IMPLANTABLE DEVICE | Site: HIP | Status: FUNCTIONAL
Brand: POLARSTEM

## 2019-04-29 DEVICE — REFLECTION SPHERICAL HEAD SCREW 40MM
Type: IMPLANTABLE DEVICE | Site: HIP | Status: FUNCTIONAL
Brand: REFLECTION

## 2019-04-29 RX ORDER — DIPHENHYDRAMINE HCL 25 MG
25 CAPSULE ORAL
Status: DISCONTINUED | OUTPATIENT
Start: 2019-04-29 | End: 2019-04-29 | Stop reason: HOSPADM

## 2019-04-29 RX ORDER — EPHEDRINE SULFATE 50 MG/ML
INJECTION, SOLUTION INTRAVENOUS AS NEEDED
Status: DISCONTINUED | OUTPATIENT
Start: 2019-04-29 | End: 2019-04-29 | Stop reason: SURG

## 2019-04-29 RX ORDER — FERROUS SULFATE 325(65) MG
325 TABLET ORAL
Status: DISCONTINUED | OUTPATIENT
Start: 2019-04-29 | End: 2019-05-01 | Stop reason: HOSPADM

## 2019-04-29 RX ORDER — PROMETHAZINE HYDROCHLORIDE 25 MG/1
25 TABLET ORAL ONCE AS NEEDED
Status: DISCONTINUED | OUTPATIENT
Start: 2019-04-29 | End: 2019-04-29 | Stop reason: HOSPADM

## 2019-04-29 RX ORDER — SODIUM CHLORIDE, SODIUM LACTATE, POTASSIUM CHLORIDE, CALCIUM CHLORIDE 600; 310; 30; 20 MG/100ML; MG/100ML; MG/100ML; MG/100ML
100 INJECTION, SOLUTION INTRAVENOUS CONTINUOUS
Status: DISCONTINUED | OUTPATIENT
Start: 2019-04-29 | End: 2019-05-01 | Stop reason: HOSPADM

## 2019-04-29 RX ORDER — FAMOTIDINE 10 MG/ML
20 INJECTION, SOLUTION INTRAVENOUS ONCE
Status: COMPLETED | OUTPATIENT
Start: 2019-04-29 | End: 2019-04-29

## 2019-04-29 RX ORDER — FLUMAZENIL 0.1 MG/ML
0.2 INJECTION INTRAVENOUS AS NEEDED
Status: DISCONTINUED | OUTPATIENT
Start: 2019-04-29 | End: 2019-04-29 | Stop reason: HOSPADM

## 2019-04-29 RX ORDER — SODIUM CHLORIDE, SODIUM LACTATE, POTASSIUM CHLORIDE, CALCIUM CHLORIDE 600; 310; 30; 20 MG/100ML; MG/100ML; MG/100ML; MG/100ML
9 INJECTION, SOLUTION INTRAVENOUS CONTINUOUS
Status: DISCONTINUED | OUTPATIENT
Start: 2019-04-29 | End: 2019-05-01 | Stop reason: HOSPADM

## 2019-04-29 RX ORDER — BISACODYL 5 MG/1
10 TABLET, DELAYED RELEASE ORAL DAILY PRN
Status: DISCONTINUED | OUTPATIENT
Start: 2019-04-29 | End: 2019-05-01 | Stop reason: HOSPADM

## 2019-04-29 RX ORDER — HYDROCODONE BITARTRATE AND ACETAMINOPHEN 7.5; 325 MG/1; MG/1
1 TABLET ORAL ONCE AS NEEDED
Status: DISCONTINUED | OUTPATIENT
Start: 2019-04-29 | End: 2019-04-29 | Stop reason: HOSPADM

## 2019-04-29 RX ORDER — FENTANYL CITRATE 50 UG/ML
50 INJECTION, SOLUTION INTRAMUSCULAR; INTRAVENOUS
Status: DISCONTINUED | OUTPATIENT
Start: 2019-04-29 | End: 2019-04-29 | Stop reason: HOSPADM

## 2019-04-29 RX ORDER — SODIUM CHLORIDE 0.9 % (FLUSH) 0.9 %
1-10 SYRINGE (ML) INJECTION AS NEEDED
Status: DISCONTINUED | OUTPATIENT
Start: 2019-04-29 | End: 2019-04-29 | Stop reason: HOSPADM

## 2019-04-29 RX ORDER — DEXAMETHASONE SODIUM PHOSPHATE 10 MG/ML
INJECTION INTRAMUSCULAR; INTRAVENOUS AS NEEDED
Status: DISCONTINUED | OUTPATIENT
Start: 2019-04-29 | End: 2019-04-29 | Stop reason: SURG

## 2019-04-29 RX ORDER — ACETAMINOPHEN 325 MG/1
325 TABLET ORAL EVERY 4 HOURS PRN
Status: DISCONTINUED | OUTPATIENT
Start: 2019-04-29 | End: 2019-05-01 | Stop reason: HOSPADM

## 2019-04-29 RX ORDER — LIDOCAINE HYDROCHLORIDE 20 MG/ML
INJECTION, SOLUTION INFILTRATION; PERINEURAL AS NEEDED
Status: DISCONTINUED | OUTPATIENT
Start: 2019-04-29 | End: 2019-04-29 | Stop reason: SURG

## 2019-04-29 RX ORDER — PROPOFOL 10 MG/ML
VIAL (ML) INTRAVENOUS AS NEEDED
Status: DISCONTINUED | OUTPATIENT
Start: 2019-04-29 | End: 2019-04-29 | Stop reason: SURG

## 2019-04-29 RX ORDER — PROMETHAZINE HYDROCHLORIDE 25 MG/ML
12.5 INJECTION, SOLUTION INTRAMUSCULAR; INTRAVENOUS ONCE AS NEEDED
Status: DISCONTINUED | OUTPATIENT
Start: 2019-04-29 | End: 2019-04-29 | Stop reason: HOSPADM

## 2019-04-29 RX ORDER — ROCURONIUM BROMIDE 10 MG/ML
INJECTION, SOLUTION INTRAVENOUS AS NEEDED
Status: DISCONTINUED | OUTPATIENT
Start: 2019-04-29 | End: 2019-04-29 | Stop reason: SURG

## 2019-04-29 RX ORDER — CEFAZOLIN SODIUM 2 G/100ML
INJECTION, SOLUTION INTRAVENOUS AS NEEDED
Status: DISCONTINUED | OUTPATIENT
Start: 2019-04-29 | End: 2019-04-29 | Stop reason: SURG

## 2019-04-29 RX ORDER — ACETAMINOPHEN 160 MG/5ML
650 SOLUTION ORAL EVERY 4 HOURS PRN
Status: DISCONTINUED | OUTPATIENT
Start: 2019-04-29 | End: 2019-05-01 | Stop reason: HOSPADM

## 2019-04-29 RX ORDER — FENTANYL CITRATE 50 UG/ML
INJECTION, SOLUTION INTRAMUSCULAR; INTRAVENOUS AS NEEDED
Status: DISCONTINUED | OUTPATIENT
Start: 2019-04-29 | End: 2019-04-29 | Stop reason: SURG

## 2019-04-29 RX ORDER — ASPIRIN 325 MG
325 TABLET, DELAYED RELEASE (ENTERIC COATED) ORAL DAILY
Status: DISCONTINUED | OUTPATIENT
Start: 2019-04-29 | End: 2019-05-01 | Stop reason: HOSPADM

## 2019-04-29 RX ORDER — MIDAZOLAM HYDROCHLORIDE 1 MG/ML
2 INJECTION INTRAMUSCULAR; INTRAVENOUS
Status: DISCONTINUED | OUTPATIENT
Start: 2019-04-29 | End: 2019-04-29 | Stop reason: HOSPADM

## 2019-04-29 RX ORDER — NALOXONE HCL 0.4 MG/ML
0.2 VIAL (ML) INJECTION AS NEEDED
Status: DISCONTINUED | OUTPATIENT
Start: 2019-04-29 | End: 2019-04-29 | Stop reason: HOSPADM

## 2019-04-29 RX ORDER — SENNA AND DOCUSATE SODIUM 50; 8.6 MG/1; MG/1
2 TABLET, FILM COATED ORAL ONCE
Status: COMPLETED | OUTPATIENT
Start: 2019-04-29 | End: 2019-04-29

## 2019-04-29 RX ORDER — MAGNESIUM HYDROXIDE 1200 MG/15ML
LIQUID ORAL AS NEEDED
Status: DISCONTINUED | OUTPATIENT
Start: 2019-04-29 | End: 2019-04-29 | Stop reason: HOSPADM

## 2019-04-29 RX ORDER — HYDROMORPHONE HYDROCHLORIDE 1 MG/ML
0.5 INJECTION, SOLUTION INTRAMUSCULAR; INTRAVENOUS; SUBCUTANEOUS EVERY 4 HOURS PRN
Status: DISCONTINUED | OUTPATIENT
Start: 2019-04-29 | End: 2019-05-01 | Stop reason: HOSPADM

## 2019-04-29 RX ORDER — LIDOCAINE HYDROCHLORIDE 40 MG/ML
SOLUTION TOPICAL AS NEEDED
Status: DISCONTINUED | OUTPATIENT
Start: 2019-04-29 | End: 2019-04-29 | Stop reason: SURG

## 2019-04-29 RX ORDER — MIDAZOLAM HYDROCHLORIDE 1 MG/ML
1 INJECTION INTRAMUSCULAR; INTRAVENOUS
Status: DISCONTINUED | OUTPATIENT
Start: 2019-04-29 | End: 2019-04-29 | Stop reason: HOSPADM

## 2019-04-29 RX ORDER — ONDANSETRON 2 MG/ML
4 INJECTION INTRAMUSCULAR; INTRAVENOUS ONCE AS NEEDED
Status: DISCONTINUED | OUTPATIENT
Start: 2019-04-29 | End: 2019-04-29 | Stop reason: HOSPADM

## 2019-04-29 RX ORDER — ONDANSETRON 2 MG/ML
4 INJECTION INTRAMUSCULAR; INTRAVENOUS EVERY 6 HOURS PRN
Status: DISCONTINUED | OUTPATIENT
Start: 2019-04-29 | End: 2019-05-01 | Stop reason: HOSPADM

## 2019-04-29 RX ORDER — OXYCODONE AND ACETAMINOPHEN 7.5; 325 MG/1; MG/1
1 TABLET ORAL ONCE AS NEEDED
Status: DISCONTINUED | OUTPATIENT
Start: 2019-04-29 | End: 2019-04-29 | Stop reason: HOSPADM

## 2019-04-29 RX ORDER — TRANEXAMIC ACID 100 MG/ML
INJECTION, SOLUTION INTRAVENOUS AS NEEDED
Status: DISCONTINUED | OUTPATIENT
Start: 2019-04-29 | End: 2019-04-29 | Stop reason: SURG

## 2019-04-29 RX ORDER — ONDANSETRON 2 MG/ML
INJECTION INTRAMUSCULAR; INTRAVENOUS AS NEEDED
Status: DISCONTINUED | OUTPATIENT
Start: 2019-04-29 | End: 2019-04-29 | Stop reason: SURG

## 2019-04-29 RX ORDER — ACETAMINOPHEN 650 MG/1
650 SUPPOSITORY RECTAL EVERY 4 HOURS PRN
Status: DISCONTINUED | OUTPATIENT
Start: 2019-04-29 | End: 2019-05-01 | Stop reason: HOSPADM

## 2019-04-29 RX ORDER — KETOROLAC TROMETHAMINE 15 MG/ML
15 INJECTION, SOLUTION INTRAMUSCULAR; INTRAVENOUS EVERY 6 HOURS
Status: COMPLETED | OUTPATIENT
Start: 2019-04-29 | End: 2019-04-30

## 2019-04-29 RX ORDER — PROMETHAZINE HYDROCHLORIDE 25 MG/1
25 SUPPOSITORY RECTAL ONCE AS NEEDED
Status: DISCONTINUED | OUTPATIENT
Start: 2019-04-29 | End: 2019-04-29 | Stop reason: HOSPADM

## 2019-04-29 RX ORDER — ACETAMINOPHEN 325 MG/1
650 TABLET ORAL EVERY 4 HOURS PRN
Status: DISCONTINUED | OUTPATIENT
Start: 2019-04-29 | End: 2019-05-01 | Stop reason: HOSPADM

## 2019-04-29 RX ORDER — CEFAZOLIN SODIUM 2 G/100ML
2 INJECTION, SOLUTION INTRAVENOUS EVERY 8 HOURS
Status: COMPLETED | OUTPATIENT
Start: 2019-04-29 | End: 2019-04-30

## 2019-04-29 RX ORDER — ONDANSETRON 4 MG/1
4 TABLET, FILM COATED ORAL EVERY 6 HOURS PRN
Status: DISCONTINUED | OUTPATIENT
Start: 2019-04-29 | End: 2019-05-01 | Stop reason: HOSPADM

## 2019-04-29 RX ORDER — ACETAMINOPHEN 325 MG/1
650 TABLET ORAL ONCE AS NEEDED
Status: DISCONTINUED | OUTPATIENT
Start: 2019-04-29 | End: 2019-04-29 | Stop reason: HOSPADM

## 2019-04-29 RX ORDER — LIDOCAINE HYDROCHLORIDE 10 MG/ML
0.5 INJECTION, SOLUTION EPIDURAL; INFILTRATION; INTRACAUDAL; PERINEURAL ONCE AS NEEDED
Status: DISCONTINUED | OUTPATIENT
Start: 2019-04-29 | End: 2019-04-29 | Stop reason: HOSPADM

## 2019-04-29 RX ORDER — BISACODYL 10 MG
10 SUPPOSITORY, RECTAL RECTAL DAILY PRN
Status: DISCONTINUED | OUTPATIENT
Start: 2019-04-29 | End: 2019-05-01 | Stop reason: HOSPADM

## 2019-04-29 RX ORDER — ACETAMINOPHEN 500 MG
1000 TABLET ORAL ONCE
Status: COMPLETED | OUTPATIENT
Start: 2019-04-29 | End: 2019-04-29

## 2019-04-29 RX ORDER — HYDRALAZINE HYDROCHLORIDE 20 MG/ML
5 INJECTION INTRAMUSCULAR; INTRAVENOUS
Status: DISCONTINUED | OUTPATIENT
Start: 2019-04-29 | End: 2019-04-29 | Stop reason: HOSPADM

## 2019-04-29 RX ORDER — HYDROMORPHONE HCL 110MG/55ML
PATIENT CONTROLLED ANALGESIA SYRINGE INTRAVENOUS AS NEEDED
Status: DISCONTINUED | OUTPATIENT
Start: 2019-04-29 | End: 2019-04-29 | Stop reason: SURG

## 2019-04-29 RX ORDER — HYDROMORPHONE HYDROCHLORIDE 1 MG/ML
0.5 INJECTION, SOLUTION INTRAMUSCULAR; INTRAVENOUS; SUBCUTANEOUS
Status: DISCONTINUED | OUTPATIENT
Start: 2019-04-29 | End: 2019-04-29 | Stop reason: HOSPADM

## 2019-04-29 RX ORDER — OXYCODONE HYDROCHLORIDE AND ACETAMINOPHEN 5; 325 MG/1; MG/1
1 TABLET ORAL EVERY 4 HOURS PRN
Status: DISCONTINUED | OUTPATIENT
Start: 2019-04-29 | End: 2019-05-01 | Stop reason: HOSPADM

## 2019-04-29 RX ORDER — OXYCODONE HYDROCHLORIDE AND ACETAMINOPHEN 5; 325 MG/1; MG/1
2 TABLET ORAL EVERY 4 HOURS PRN
Status: DISCONTINUED | OUTPATIENT
Start: 2019-04-29 | End: 2019-05-01 | Stop reason: HOSPADM

## 2019-04-29 RX ORDER — EPHEDRINE SULFATE 50 MG/ML
5 INJECTION, SOLUTION INTRAVENOUS ONCE AS NEEDED
Status: DISCONTINUED | OUTPATIENT
Start: 2019-04-29 | End: 2019-04-29 | Stop reason: HOSPADM

## 2019-04-29 RX ORDER — NALOXONE HCL 0.4 MG/ML
0.1 VIAL (ML) INJECTION
Status: DISCONTINUED | OUTPATIENT
Start: 2019-04-29 | End: 2019-05-01 | Stop reason: HOSPADM

## 2019-04-29 RX ADMIN — FERROUS SULFATE TAB 325 MG (65 MG ELEMENTAL FE) 325 MG: 325 (65 FE) TAB at 14:29

## 2019-04-29 RX ADMIN — ROCURONIUM BROMIDE 50 MG: 10 INJECTION INTRAVENOUS at 10:26

## 2019-04-29 RX ADMIN — ONDANSETRON 4 MG: 2 INJECTION INTRAMUSCULAR; INTRAVENOUS at 11:28

## 2019-04-29 RX ADMIN — MIDAZOLAM 1 MG: 1 INJECTION INTRAMUSCULAR; INTRAVENOUS at 09:01

## 2019-04-29 RX ADMIN — KETOROLAC TROMETHAMINE 15 MG: 15 INJECTION, SOLUTION INTRAMUSCULAR; INTRAVENOUS at 14:29

## 2019-04-29 RX ADMIN — LIDOCAINE HYDROCHLORIDE 1 EACH: 40 SOLUTION TOPICAL at 10:26

## 2019-04-29 RX ADMIN — CEFAZOLIN SODIUM 2 G: 2 INJECTION, SOLUTION INTRAVENOUS at 11:27

## 2019-04-29 RX ADMIN — SODIUM CHLORIDE, POTASSIUM CHLORIDE, SODIUM LACTATE AND CALCIUM CHLORIDE 9 ML/HR: 600; 310; 30; 20 INJECTION, SOLUTION INTRAVENOUS at 08:00

## 2019-04-29 RX ADMIN — ASPIRIN 325 MG: 325 TABLET, COATED ORAL at 14:29

## 2019-04-29 RX ADMIN — HYDROMORPHONE HYDROCHLORIDE 0.5 MG: 1 INJECTION, SOLUTION INTRAMUSCULAR; INTRAVENOUS; SUBCUTANEOUS at 12:30

## 2019-04-29 RX ADMIN — SODIUM CHLORIDE, POTASSIUM CHLORIDE, SODIUM LACTATE AND CALCIUM CHLORIDE 9 ML/HR: 600; 310; 30; 20 INJECTION, SOLUTION INTRAVENOUS at 12:11

## 2019-04-29 RX ADMIN — EPHEDRINE SULFATE 5 MG: 50 INJECTION INTRAMUSCULAR; INTRAVENOUS; SUBCUTANEOUS at 11:24

## 2019-04-29 RX ADMIN — FAMOTIDINE 20 MG: 10 INJECTION INTRAVENOUS at 09:01

## 2019-04-29 RX ADMIN — PROPOFOL 170 MG: 10 INJECTION, EMULSION INTRAVENOUS at 10:26

## 2019-04-29 RX ADMIN — HYDROMORPHONE HYDROCHLORIDE 0.5 MG: 2 INJECTION INTRAMUSCULAR; INTRAVENOUS; SUBCUTANEOUS at 11:53

## 2019-04-29 RX ADMIN — CEFAZOLIN SODIUM 2 G: 2 INJECTION, SOLUTION INTRAVENOUS at 21:20

## 2019-04-29 RX ADMIN — SENNOSIDES AND DOCUSATE SODIUM 2 TABLET: 8.6; 5 TABLET ORAL at 21:20

## 2019-04-29 RX ADMIN — TRANEXAMIC ACID 1000 MG: 100 INJECTION, SOLUTION INTRAVENOUS at 11:12

## 2019-04-29 RX ADMIN — KETOROLAC TROMETHAMINE 15 MG: 15 INJECTION, SOLUTION INTRAMUSCULAR; INTRAVENOUS at 21:21

## 2019-04-29 RX ADMIN — FENTANYL CITRATE 100 MCG: 50 INJECTION INTRAMUSCULAR; INTRAVENOUS at 10:26

## 2019-04-29 RX ADMIN — VANCOMYCIN HYDROCHLORIDE 1500 MG: 10 INJECTION, POWDER, LYOPHILIZED, FOR SOLUTION INTRAVENOUS at 08:02

## 2019-04-29 RX ADMIN — HYDROMORPHONE HYDROCHLORIDE 0.5 MG: 2 INJECTION INTRAMUSCULAR; INTRAVENOUS; SUBCUTANEOUS at 11:36

## 2019-04-29 RX ADMIN — LIDOCAINE HYDROCHLORIDE 80 MG: 20 INJECTION, SOLUTION INFILTRATION; PERINEURAL at 10:26

## 2019-04-29 RX ADMIN — SUGAMMADEX 200 MG: 100 INJECTION, SOLUTION INTRAVENOUS at 11:32

## 2019-04-29 RX ADMIN — ACETAMINOPHEN 1000 MG: 500 TABLET, FILM COATED ORAL at 08:02

## 2019-04-29 RX ADMIN — DEXAMETHASONE SODIUM PHOSPHATE 8 MG: 10 INJECTION INTRAMUSCULAR; INTRAVENOUS at 10:43

## 2019-04-29 RX ADMIN — FENTANYL CITRATE 50 MCG: 50 INJECTION INTRAMUSCULAR; INTRAVENOUS at 10:52

## 2019-04-29 RX ADMIN — FENTANYL CITRATE 50 MCG: 50 INJECTION INTRAMUSCULAR; INTRAVENOUS at 10:57

## 2019-04-29 NOTE — ANESTHESIA POSTPROCEDURE EVALUATION
"Patient: Rohit Asher    Procedure Summary     Date:  04/29/19 Room / Location:  [facility] OR [address] [place] [address] MAIN OR    Anesthesia Start:  1020 Anesthesia Stop:  1155    Procedure:  LEFT TOTAL HIP ARTHROPLASTY (Left Hip) Diagnosis:      Surgeon:  Ramiro Garcia MD Provider:  Vno Murphy MD    Anesthesia Type:  general ASA Status:  3          Anesthesia Type: general  Last vitals  BP   141/76 (04/29/19 1235)   Temp   36.8 °C (98.3 °F) (04/29/19 1152)   Pulse   56 (04/29/19 1235)   Resp   16 (04/29/19 1235)     SpO2   96 % (04/29/19 1235)     Post Anesthesia Care and Evaluation    Patient location during evaluation: bedside  Patient participation: complete - patient participated  Level of consciousness: awake  Pain score: 2  Pain management: adequate  Airway patency: patent  Anesthetic complications: No anesthetic complications  PONV Status: none  Cardiovascular status: acceptable  Respiratory status: acceptable  Hydration status: acceptable    Comments: /76   Pulse 56   Temp 36.8 °C (98.3 °F) (Oral)   Resp 16   Ht 188 cm (74\")   Wt 93.4 kg (206 lb)   SpO2 96%   BMI 26.45 kg/m²         "

## 2019-04-29 NOTE — ANESTHESIA PREPROCEDURE EVALUATION
Anesthesia Evaluation     Patient summary reviewed and Nursing notes reviewed   history of anesthetic complications: PONV               Airway   Mallampati: II  TM distance: >3 FB  Neck ROM: limited  Possible difficult intubation  Dental - normal exam     Pulmonary - negative pulmonary ROS    breath sounds clear to auscultation  (-) shortness of breath, sleep apnea, decreased breath sounds, wheezes  Cardiovascular - normal exam  Exercise tolerance: good (4-7 METS)    Rhythm: regular  Rate: normal    (+) dysrhythmias,   (-) past MI, angina, CHF, orthopnea, PND, [place], PVD      Neuro/Psych- negative ROS  (-) seizures, neuromuscular disease, TIA, CVA, dizziness/light headedness, weakness, numbness  GI/Hepatic/Renal/Endo - negative ROS   (-) liver disease, diabetes    Musculoskeletal     Abdominal  - normal exam   Substance History - negative use  (-) alcohol use, drug use     OB/GYN negative ob/gyn ROS         Other   (+) arthritis                     Anesthesia Plan    ASA 3     general     intravenous induction   Anesthetic plan, all risks, benefits, and alternatives have been provided, discussed and informed consent has been obtained with: patient.    Plan discussed with CRNA.

## 2019-04-29 NOTE — ANESTHESIA PROCEDURE NOTES
Airway  Urgency: elective    Airway not difficult    General Information and Staff    Patient location during procedure: OR  Anesthesiologist: Von Murphy MD  CRNA: Shandra Fernandez CRNA    Indications and Patient Condition  Indications for airway management: airway protection    Preoxygenated: yes  MILS not maintained throughout  Mask difficulty assessment: 1 - vent by mask    Final Airway Details  Final airway type: endotracheal airway      Successful airway: ETT  Cuffed: yes   Successful intubation technique: direct laryngoscopy  Endotracheal tube insertion site: oral  Blade: Tej  Blade size: 4  ETT size (mm): 7.5  Cormack-Lehane Classification: grade I - full view of glottis  Placement verified by: chest auscultation and capnometry   Measured from: lips  ETT to lips (cm): 22  Number of attempts at approach: 1    Additional Comments  Dentition intact and unchanged. CBEBS.  +ETCO2.

## 2019-04-30 LAB
ANION GAP SERPL CALCULATED.3IONS-SCNC: 9.7 MMOL/L
BUN BLD-MCNC: 19 MG/DL (ref 8–23)
BUN/CREAT SERPL: 19.2 (ref 7–25)
CALCIUM SPEC-SCNC: 8.6 MG/DL (ref 8.6–10.5)
CHLORIDE SERPL-SCNC: 103 MMOL/L (ref 98–107)
CO2 SERPL-SCNC: 24.3 MMOL/L (ref 22–29)
CREAT BLD-MCNC: 0.99 MG/DL (ref 0.76–1.27)
GFR SERPL CREATININE-BSD FRML MDRD: 73 ML/MIN/1.73
GLUCOSE BLD-MCNC: 150 MG/DL (ref 65–99)
HCT VFR BLD AUTO: 39.1 % (ref 37.5–51)
HGB BLD-MCNC: 12.9 G/DL (ref 13–17.7)
POTASSIUM BLD-SCNC: 4.4 MMOL/L (ref 3.5–5.2)
SODIUM BLD-SCNC: 137 MMOL/L (ref 136–145)

## 2019-04-30 PROCEDURE — 85014 HEMATOCRIT: CPT | Performed by: ORTHOPAEDIC SURGERY

## 2019-04-30 PROCEDURE — 25010000002 KETOROLAC TROMETHAMINE PER 15 MG: Performed by: ORTHOPAEDIC SURGERY

## 2019-04-30 PROCEDURE — 97110 THERAPEUTIC EXERCISES: CPT

## 2019-04-30 PROCEDURE — 85018 HEMOGLOBIN: CPT | Performed by: ORTHOPAEDIC SURGERY

## 2019-04-30 PROCEDURE — 80048 BASIC METABOLIC PNL TOTAL CA: CPT | Performed by: ORTHOPAEDIC SURGERY

## 2019-04-30 PROCEDURE — 25010000002 FONDAPARINUX PER 0.5 MG: Performed by: ORTHOPAEDIC SURGERY

## 2019-04-30 PROCEDURE — 97150 GROUP THERAPEUTIC PROCEDURES: CPT

## 2019-04-30 PROCEDURE — 25010000003 CEFAZOLIN IN DEXTROSE 2-4 GM/100ML-% SOLUTION: Performed by: ORTHOPAEDIC SURGERY

## 2019-04-30 RX ORDER — FONDAPARINUX SODIUM 2.5 MG/.5ML
2.5 INJECTION SUBCUTANEOUS ONCE
Status: COMPLETED | OUTPATIENT
Start: 2019-04-30 | End: 2019-04-30

## 2019-04-30 RX ADMIN — FONDAPARINUX SODIUM 2.5 MG: 2.5 INJECTION, SOLUTION SUBCUTANEOUS at 08:51

## 2019-04-30 RX ADMIN — ASPIRIN 325 MG: 325 TABLET, COATED ORAL at 08:51

## 2019-04-30 RX ADMIN — KETOROLAC TROMETHAMINE 15 MG: 15 INJECTION, SOLUTION INTRAMUSCULAR; INTRAVENOUS at 02:50

## 2019-04-30 RX ADMIN — FERROUS SULFATE TAB 325 MG (65 MG ELEMENTAL FE) 325 MG: 325 (65 FE) TAB at 08:51

## 2019-04-30 RX ADMIN — OXYCODONE AND ACETAMINOPHEN 1 TABLET: 5; 325 TABLET ORAL at 17:21

## 2019-04-30 RX ADMIN — CEFAZOLIN SODIUM 2 G: 2 INJECTION, SOLUTION INTRAVENOUS at 02:49

## 2019-04-30 RX ADMIN — KETOROLAC TROMETHAMINE 15 MG: 15 INJECTION, SOLUTION INTRAMUSCULAR; INTRAVENOUS at 08:52

## 2019-04-30 RX ADMIN — OXYCODONE AND ACETAMINOPHEN 1 TABLET: 5; 325 TABLET ORAL at 13:20

## 2019-04-30 RX ADMIN — OXYCODONE AND ACETAMINOPHEN 1 TABLET: 5; 325 TABLET ORAL at 08:52

## 2019-04-30 RX ADMIN — OXYCODONE AND ACETAMINOPHEN 1 TABLET: 5; 325 TABLET ORAL at 21:21

## 2019-05-01 VITALS
HEIGHT: 74 IN | DIASTOLIC BLOOD PRESSURE: 76 MMHG | SYSTOLIC BLOOD PRESSURE: 133 MMHG | RESPIRATION RATE: 16 BRPM | TEMPERATURE: 98 F | BODY MASS INDEX: 26.43 KG/M2 | HEART RATE: 72 BPM | WEIGHT: 205.91 LBS | OXYGEN SATURATION: 96 %

## 2019-05-01 PROCEDURE — 97110 THERAPEUTIC EXERCISES: CPT

## 2019-05-01 PROCEDURE — 97150 GROUP THERAPEUTIC PROCEDURES: CPT

## 2019-05-01 RX ORDER — OXYCODONE HYDROCHLORIDE AND ACETAMINOPHEN 5; 325 MG/1; MG/1
1-2 TABLET ORAL EVERY 4 HOURS PRN
Qty: 60 TABLET | Refills: 0 | Status: SHIPPED | OUTPATIENT
Start: 2019-05-01 | End: 2019-05-09

## 2019-05-01 RX ADMIN — FERROUS SULFATE TAB 325 MG (65 MG ELEMENTAL FE) 325 MG: 325 (65 FE) TAB at 08:26

## 2019-05-01 RX ADMIN — OXYCODONE AND ACETAMINOPHEN 2 TABLET: 5; 325 TABLET ORAL at 12:07

## 2019-05-01 RX ADMIN — ASPIRIN 325 MG: 325 TABLET, COATED ORAL at 08:26

## 2019-05-01 RX ADMIN — OXYCODONE AND ACETAMINOPHEN 1 TABLET: 5; 325 TABLET ORAL at 03:59

## 2019-05-01 RX ADMIN — OXYCODONE AND ACETAMINOPHEN 1 TABLET: 5; 325 TABLET ORAL at 08:26

## 2019-05-01 NOTE — DISCHARGE SUMMARY
Discharge Summary   Ramiro Garcia M.D.    NAME: Rohit Asher ADMIT: 2019   : 1935  PCP: Steven Isaacs MD    MRN: [id number] LOS: 2 days   AGE/SEX: 83 y.o. 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  male who underwent successful Left Total Hip Arthroplasty on 2019.  Rohit Asher was started on Aspirin 325mg po daily immediately post-operatively for DVT prophylaxis.  Note that I also gave him Arixtra 2.5 mg subcutaneous morning after surgery.  On post-op day 1 the patients dressing was changed and their incision was clean, with no signs of infection and their calf was soft, with no signs of DVT.  The patient progressed well with physical therapy and the patients hemoglobin remained stable. On post-operative day 2 the patient was felt ready for discharge.      Total Hip Replacement Discharge Instructions:    I. ACTIVITIES:    1. Exercises:  ? Complete exercise program as taught by the hospital physical therapist 2 times per day  ? Exercise program will be advanced by the physical therapist  ? During the day be up ambulating every 2 hours (while awake) for short distances  ? Complete the ankle pump exercises at least 10 times per hour (while awake)  ? Elevate legs when in bed and for at least 30 minutes during the day.Use cold packs 20-30 minutes approximately 5 times per day. This should be done before and after completing your exercises and at any time you are experiencing pain/ stiffness in  your operative extremity.    2. Activities of Daily Living:  ? No tub baths, hot tubs, or swimming pools for 4 weeks  ? May shower and let water run over the incision on post-operative day #5 if no drainage. Do not scrub or rub the incision. Simply let the water run over the incision and pat dry.    II. Precautions:  ? Everyone that comes near you should wash their hands  ? No elective dental, genital-urinary, or colon procedures or surgical procedures for 12 weeks after surgery unless absolutely necessary.  ? If dental work or surgical procedure is deemed absolutely necessary during the first 12 weeks, you will need to contact your surgeon as you will need to take antibiotics 1 hour prior to any dental work (including teeth cleanings).Please discuss with your surgeon prophylactic antibiotics as the length of time this intervention will be necessary for you varies with each patient’s health history and situation.  ? Avoid sick people. If you must be around someone who is ill, they should wear a mask.  ? Avoid visits to the Emergency Room or Urgent Care unless you are having a life threatening event.   ? If ordered stockings are to be placed on in the morning and removed at night. Monitor the stockings to ensure that any swelling is not causing the stockings to become too tight. In this case, remove stockings immediately.    III. INCISION CARE:    ? Wash your hands prior to dressing changes  ? Change the dressing as needed to keep incision clean and dry. Utilize dry gauze and paper tape. Avoid touching the side of the gauze that goes against the incision with your hands.  ? No creams or ointments to the incision  ? May remove dressing once the incision is free of drainage  ? Do not touch or pick at the incision  ? Check incision every day and notify surgeon immediately if any of the following signs or symptoms are noted:  o Increase in redness  o Increase in swelling around the incision and of the entire  extremity  o Increase in pain  o Drainage oozing from the incision  o Pulling apart of the edges of the incision  o Increase in overall body temperature (greater than 100.5 degrees)  ? Your surgeon will instruct you regarding suture or staple removal    IV. Medications:     1. Anticoagulants: You will be discharged on an anticoagulant. This is a prophylactic medication that helps prevent blood clots during your post-operative period. The type and length of dosage varies based on your individual needs, procedure performed, and surgeon’s preference.  ? While taking the anticoagulant, you should avoid taking any additional aspirin, ibuprofen (Advil or Motrin), Aleve (Naprosyn) or other non-steroidal anti-inflammatory medications.   ? Notify surgeon immediately if any chance bleeding is noted in the urine, stool, emesis, or from the nose or the incision. Blood in the stool will often appear as black rather than red. Blood in urine may appear as pink. Blood in emesis may appear as brown/black like coffee grounds.  ? You will need to apply pressure for longer periods of time to any cuts or abrasions to stop bleeding  ? Avoid alcohol while taking anticoagulants    2. Stool Softeners: You will be at greater risk of constipation after surgery due to being less mobile and the pain medications.   ? Take stool softeners as instructed by your surgeon while on pain medications. Bran cereal is most effective. Over the counter Colace 100 mg 1-2 capsules twice daily.   ? If stools become too loose or too frequent, please decreases the dosage or stop the stool softener.  ? If constipation occurs despite use of stool softeners, you are to continue the stool softeners and add a laxative (Milk of Magnesia 1 ounce daily as needed)  ? Drink plenty of fluids, and eat fruits and vegetables during your recovery time    3. Pain Medications utilized after surgery are narcotics and the law requires that the following information be given to all  patients that are prescribed narcotics:  ? CLASSIFICATION: Pain medications are called Opioids and are narcotics  ? LEGALITIES: It is illegal to share narcotics with others and to drive within 24 hours of taking narcotics  ? POTENTIAL SIDE EFFECTS: Potential side effects of opioids include: nausea, vomiting, itching, dizziness, drowsiness, dry mouth, constipation, and difficulty urinating.  ? POTENTIAL ADVERSE EFFECTS:   o Opioid tolerance can develop with use of pain medications and this simply means that it requires more and more of the medication to control pain; however, this is seen more in patients that use opioids for longer periods of time.  o Opioid dependence can develop with use of Opioids and this simply means that to stop the medication can cause withdrawal symptoms; however, this is seen with patients that use Opioids for longer periods of time.  o Opioid addiction can develop with use of Opioids and the incidence of this is very unlikely in patients who take the medications as ordered and stop the medications as instructed.  o Opioid overdose can be dangerous, but is unlikely when the medication is taken as ordered and stopped when ordered. It is important not to mix opioids with alcohol or with and type of sedative such as Benadryl as this can lead to over sedation and respiratory difficulty.  ? DOSAGE:   o Pain medications will need to be taken consistently for the first week to decrease pain and promote adequate pain relief and participation in physical therapy.  o After the initial surgical pain begins to resolve, you may begin to decrease the pain medication. By the end of 6-8 weeks, you should be off of pain medications.  o Refills will not be given by the office during evening hours, on weekends, or after 6-8 weeks post-op.  o To seek refills on pain medications during the initial 6 week post-operative period, you must call the office 48 hours in advance to request the refill. The office will  then notify you when to  the prescription. DO NOT wait until you are out of the medication to request a refill.    V. FOLLOW-UP VISITS:  ? You will need to follow up in the office with your surgeon in 3 weeks. Please call this number [phone]  to schedule this appointment.  If you have any concerns or suspected complications prior to your follow up visit, please call your surgeons office. Do not wait until your appointment time if you suspect complications. These will need to be addressed in the office promptly.    Discharge Medications:    1) Percocet 5/325 1-2 po q 4-6 hours prn pain  2)  Enteric Coated Aspirin 325 mg po daily for 6 weeks.      Ramiro Garcia MD  5/1/2019  7:07 AM

## 2019-05-01 NOTE — PLAN OF CARE
Problem: Patient Care Overview  Goal: Plan of Care Review  Outcome: Ongoing (interventions implemented as appropriate)   05/01/19 1023   Coping/Psychosocial   Plan of Care Reviewed With patient   OTHER   Outcome Summary VSS. Dressing c/d/i. Pain controlled with PO pain med. Up to chair and worked with PT today. Ambulates with stancby assist and walker. Discharging home with HH.    Plan of Care Review   Progress improving       Problem: Fall Risk (Adult)  Goal: Identify Related Risk Factors and Signs and Symptoms  Outcome: Ongoing (interventions implemented as appropriate)    Goal: Absence of Fall  Outcome: Ongoing (interventions implemented as appropriate)      Problem: Hip Arthroplasty (Total, Partial) (Adult)  Goal: Signs and Symptoms of Listed Potential Problems Will be Absent, Minimized or Managed (Hip Arthroplasty)  Outcome: Ongoing (interventions implemented as appropriate)

## 2019-05-01 NOTE — PLAN OF CARE
Problem: Patient Care Overview  Goal: Plan of Care Review  Outcome: Ongoing (interventions implemented as appropriate)   05/01/19 0250   Coping/Psychosocial   Plan of Care Reviewed With patient   OTHER   Outcome Summary VSS, up with minimal assist with walker, percocet for pain control, no issues through the night, dc home with HH, will continue to monitor.   Plan of Care Review   Progress improving       Problem: Hip Arthroplasty (Total, Partial) (Adult)  Goal: Signs and Symptoms of Listed Potential Problems Will be Absent, Minimized or Managed (Hip Arthroplasty)  Outcome: Ongoing (interventions implemented as appropriate)

## 2019-05-01 NOTE — PROGRESS NOTES
"  Orthopaedic Surgery   Daily Progress Note  Dr. Ramiro Garcia Sr  [phone]  DEMOGRAPHICS:   · Rohit Asher   · Age:83 y.o.   · MRN:[id number]  · Admitted: 4/29/2019    PROCEDURE: 2 Days Post-Op s/p Procedure(s):  LEFT TOTAL HIP ARTHROPLASTY     /79 (BP Location: Left arm, Patient Position: Lying)   Pulse 65   Temp 97.9 °F (36.6 °C) (Oral)   Resp 16   Ht 188 cm (74.02\")   Wt 93.4 kg (205 lb 14.6 oz)   SpO2 95%   BMI 26.43 kg/m²     Lab Results (last 24 hours)     ** No results found for the last 24 hours. **          Imaging Results (last 24 hours)     ** No results found for the last 24 hours. **          Patient Care Team:  Steven Isaacs MD as PCP - General (Family Medicine)    SUBJECTIVE  More soreness today as expected    PHYSICAL EXAM  Wound fine     ASSESSMENT: Patient is a 83 y.o. male who is 2 Days Post-Op s/p Procedure(s):  LEFT TOTAL HIP ARTHROPLASTY     PLAN / DISPOSITION:  Home health discharge today    Ramiro Garcia Sr, MD  Orthopaedic Surgery  [facility]  [phone]        "

## 2019-05-01 NOTE — THERAPY TREATMENT NOTE
Acute Care - Physical Therapy Treatment Note  [facility]     Patient Name: Rohit Asher  : 1935  MRN: [id number]  Today's Date: 2019  Onset of Illness/Injury or Date of Surgery: 19  Date of Referral to PT: 19  Referring Physician: Ramiro Bucio    Admit Date: 2019    Visit Dx:    ICD-10-CM ICD-9-CM   1. Difficulty walking R26.2 719.7   2. Primary osteoarthritis of left hip M16.12 715.15     Patient Active Problem List   Diagnosis   • Degenerative joint disease (DJD) of hip       Therapy Treatment    Rehabilitation Treatment Summary     Row Name 19 1514 19 1309          Treatment Time/Intention    Discipline  physical therapy assistant  -  physical therapy assistant  -     Document Type  therapy note (daily note)  -  therapy note (daily note)  -     Subjective Information  complains of;pain  -  --     Mode of Treatment  physical therapy;group therapy  -  physical therapy;group therapy  -     Care Plan Review  patient/other agree to care plan  -  --     Therapy Frequency (PT Clinical Impression)  2 times/day  -  2 times/day  -     Patient Effort  excellent  -EH  excellent  -EH     Recorded by [] Katelynn Smalls PTA 19 1516 [] Katelynn Smalls [facility] 19 1312     Row Name 19 1514 19 1309          Cognitive Assessment/Intervention- PT/OT    Orientation Status (Cognition)  oriented x 3  -EH  oriented x 3  -EH     Follows Commands (Cognition)  WNL  -EH  WNL  -EH     Personal Safety Interventions  fall prevention program maintained;gait belt;nonskid shoes/slippers when out of bed  -EH  --     Recorded by [] Katelynn Smalls PTA 19 1516 [] Katelynn Smalls PTA 19 1312     Row Name 19 1309             Bed Mobility Assessment/Treatment    Sit-Supine [place] (Bed Mobility)  independent  -EH      Recorded by [] Katelynn Smalls PTA 19 1312      Row Name 19 1514 19 1309          Sit-Stand Transfer     Sit-Stand [place] (Transfers)  contact guard  -EH  contact guard  -EH     Assistive Device (Sit-Stand Transfers)  walker, front-wheeled  -EH  walker, front-wheeled  -EH     Recorded by [] Katelynn Smalls, [facility] 05/01/19 1516 [] Katelynn Smalls, [facility] 05/01/19 1312     Row Name 05/01/19 1514 05/01/19 1309          Stand-Sit Transfer    Stand-Sit [place] (Transfers)  contact guard  -  contact guard  -EH     Assistive Device (Stand-Sit Transfers)  walker, front-wheeled  -EH  walker, front-wheeled  -EH     Recorded by [] Katelynn Smalls, [facility] 05/01/19 1516 [] Katelynn Smalls, [facility] 05/01/19 1312     Row Name 05/01/19 1514 05/01/19 1309          Gait/Stairs Assessment/Training    [place] Level (Gait)  contact guard  -EH  contact guard  -EH     Assistive Device (Gait)  walker, front-wheeled  -EH  walker, front-wheeled  -EH     Distance in Feet (Gait)  200  -EH  --     Pattern (Gait)  step-to  -EH  step-to  -EH     Deviations/Abnormal Patterns (Gait)  antalgic;stride length decreased  -EH  antalgic;stride length decreased  -EH     [place] Level (Stairs)  --  contact guard  -EH     Handrail Location (Stairs)  --  both sides  -EH     Ascending Technique (Stairs)  --  step-to-step  -EH     Descending Technique (Stairs)  --  step-to-step  -EH     Recorded by [] Katelynn Smalls, [facility] 05/01/19 1516 [] Katelynn Smalls, [facility] 05/01/19 1312     Row Name 05/01/19 1514             Therapeutic Exercise    Comment (Therapeutic Exercise)  Left THR protocol X 25 reps  -EH      Recorded by [] Katelynn Smalls, [facility] 05/01/19 1516      Row Name 05/01/19 1514             Positioning and Restraints    Pre-Treatment Position  sitting in chair/recliner  -EH      Post Treatment Position  chair  -EH      In Chair  reclined;call light within reach;encouraged to call for assist  -EH      Recorded by [] Katelynn Smalls, PTA 05/01/19 1516      Row Name 05/01/19 1514 05/01/19 1309          Pain Scale: Numbers Pre/Post-Treatment    Pain  Location - Side  Left  -EH  Left  -EH     Pain Location  hip  -EH  hip  -EH     Pain Intervention(s)  Repositioned;Ambulation/increased activity  -EH  --     Recorded by [] Katelynn Smalls, PTA 05/01/19 1516 [] Katelynn Smalls, PTA 05/01/19 1312     Row Name 05/01/19 1514 05/01/19 1309          Pain Scale: Word Pre/Post-Treatment    Pain: Word Scale, Pretreatment  4 - moderate pain  -EH  4 - moderate pain  -EH     Recorded by [] Katelynn Smalls, PTA 05/01/19 1516 [] Katelynn Smalls, ANDREW 05/01/19 1312     Row Name                Wound 04/29/19 1050 Left hip incision    Wound - Properties Group Date first assessed: 04/29/19 [MB] Time first assessed: 1050 [MB] Side: Left [MB] Location: hip [MB] Type: incision [MB] Recorded by:  [MB] Ayleen Morgan RN 04/29/19 1050      User Key  (r) = Recorded By, (t) = Taken By, (c) = Cosigned By    Initials Name Effective Dates Discipline    MB Ayleen Morgan RN 06/16/16 -  Nurse     Serina Katelynn, PTA 08/19/18 -  PT          Wound 04/29/19 1050 Left hip incision (Active)   Dressing Appearance dry;intact;no drainage 5/1/2019 12:07 PM   Closure KRIS 5/1/2019 12:07 PM   Base dressing in place, unable to visualize 5/1/2019 12:07 PM   Drainage Amount none 5/1/2019  3:59 AM           Physical Therapy Education     Title: PT OT SLP Therapies (Resolved)     Topic: Physical Therapy (Resolved)     Point: Mobility training (Resolved)     Learning Progress Summary           Patient Acceptance, E,D,TB, VU,DU by  at 4/30/2019 12:04 PM    Acceptance, E,D, VU,NR by MS at 4/29/2019  3:55 PM                   Point: Home exercise program (Resolved)     Learning Progress Summary           Patient Acceptance, E,D,TB, VU,DU by  at 4/30/2019 12:04 PM    Acceptance, E,D, VU,NR by MS at 4/29/2019  3:55 PM                   Point: Body mechanics (Resolved)     Learning Progress Summary           Patient Acceptance, E,D,TB, VU,DU by  at 4/30/2019 12:04 PM    Acceptance, SHAMEKA BRIDGES VU, NR by MS at  4/29/2019  3:55 PM                   Point: Precautions (Resolved)     Learning Progress Summary           Patient Acceptance, E,D,TB, VU,DU by  at 4/30/2019 12:04 PM    Acceptance, E,D, VU,NR by MS at 4/29/2019  3:55 PM                               User Key     Initials Effective Dates Name Provider Type Discipline    MS 04/03/18 -  Santy Carballo SHAHRAM, PT Physical Therapist [facility] 08/19/18 -  Katelynn Smalls PTA Physical Therapy Assistant PT                PT Recommendation and Plan  Therapy Frequency (PT Clinical Impression): 2 times/day  Plan of Care Reviewed With: patient  Progress: improving  Outcome Summary: Pt tolerated treatment with c/o pain. Pt able to perform THR protocol exercises with increased reps with minimal c/o difficulty. Pt ambulated 200 feet with rwx, CGA.   Outcome Measures     Row Name 05/01/19 1300 04/30/19 1200 04/29/19 1500       How much help from another person do you currently need...    Turning from your back to your side while in flat bed without using bedrails?  3  -  3  -EH  3  -MS    Moving from lying on back to sitting on the side of a flat bed without bedrails?  3  -  3  -EH  3  -MS    Moving to and from a bed to a chair (including a wheelchair)?  3  -  3  -EH  3  -MS    Standing up from a chair using your arms (e.g., wheelchair, bedside chair)?  3  -  3  -EH  3  -MS    Climbing 3-5 steps with a railing?  3  -EH  2  -EH  2  -MS    To walk in hospital room?  3  -  3  -EH  3  -MS    AM-PAC 6 Clicks Score  18  -EH  17  -EH  17  -MS       Functional Assessment    Outcome Measure Options  --  --  AM-PAC 6 Clicks Basic Mobility (PT)  -MS      User Key  (r) = Recorded By, (t) = Taken By, (c) = Cosigned By    Initials Name Provider Type    Santy Terrell SHAHRAM, PT Physical Therapist     Katelynn Smalls PTA Physical Therapy Assistant         Time Calculation:   PT Charges     Row Name 05/01/19 1512 05/01/19 1308          Time Calculation    Start Time  0930  -  09[facility]      Stop Time  1002  -EH  1002  -     Time Calculation (min)  32 min  -EH  32 min  -EH     PT Received On  05/01/19  -  05/01/19  -     PT - Next Appointment  05/02/19  -  05/02/19  -        Time Calculation- PT    Total Timed Code Minutes- PT  32 minute(s)  -EH  32 minute(s)  -EH       User Key  (r) = Recorded By, (t) = Taken By, (c) = Cosigned By    Initials Name Provider Type     Katelynn Smalls PTA Physical Therapy Assistant        Therapy Charges for Today     Code Description Service Date Service Provider Modifiers Qty    30289938194 HC PT THER PROC EA 15 MIN 4/30/2019 Katelynn Smalls, PTA GP 1    79475090829 HC PT THER PROC GROUP 4/30/2019 Katelynn Smalls, PTA GP 1    68734572936 HC PT THER PROC EA 15 MIN 4/30/2019 Katelynn Smalls, PTA GP 1    56467366683 HC PT THER PROC GROUP 4/30/2019 Katelynn Smalls, PTA GP 1    22364365837 HC PT THER PROC EA 15 MIN 5/1/2019 Katelynn Smalls, PTA GP 1    29510843925 HC PT THER PROC GROUP 5/1/2019 Katelynn Smalls, PTA GP 1    40481828783 HC PT THER PROC EA 15 MIN 5/1/2019 Katelynn Smalls, PTA GP 1    23556599225 HC PT THER PROC GROUP 5/1/2019 Katelynn Smalls, PTA GP 1          PT G-Codes  Outcome Measure Options: AM-PAC 6 Clicks Basic Mobility (PT)  AM-PAC 6 Clicks Score: 18    Katelynn Smalls PTA  5/1/2019

## 2023-10-09 NOTE — DISCHARGE INSTR - ACTIVITY
E-advice sent to pt   Total Hip Replacement Discharge Instructions:     I. ACTIVITIES:     1. Exercises:  · Complete exercise program as taught by the hospital physical therapist 2 times per day  · Exercise program will be advanced by the physical therapist  · During the day be up ambulating every 2 hours (while awake) for short distances  · Complete the ankle pump exercises at least 10 times per hour (while awake)  · Elevate legs when in bed and for at least 30 minutes during the day.Use cold packs 20-30 minutes approximately 5 times per day. This should be done before and after completing your exercises and at any time you are experiencing pain/ stiffness in your operative extremity.     2. Activities of Daily Living:  · No tub baths, hot tubs, or swimming pools for 4 weeks  · May shower and let water run over the incision on post-operative day #5 if no drainage. Do not scrub or rub the incision. Simply let the water run over the incision and pat dry.     II. Precautions:  · Everyone that comes near you should wash their hands  · No elective dental, genital-urinary, or colon procedures or surgical procedures for 12 weeks after surgery unless absolutely necessary.  · If dental work or surgical procedure is deemed absolutely necessary during the first 12 weeks, you will need to contact your surgeon as you will need to take antibiotics 1 hour prior to any dental work (including teeth cleanings).Please discuss with your surgeon prophylactic antibiotics as the length of time this intervention will be necessary for you varies with each patient’s health history and situation.  · Avoid sick people. If you must be around someone who is ill, they should wear a mask.  · Avoid visits to the Emergency Room or Urgent Care unless you are having a life threatening event.   · If ordered stockings are to be placed on in the morning and removed at night. Monitor the stockings to ensure that any swelling is not causing the stockings to become too tight.  In this case, remove stockings immediately.     III. INCISION CARE:     · Wash your hands prior to dressing changes  · Change the dressing as needed to keep incision clean and dry. Utilize dry gauze and paper tape. Avoid touching the side of the gauze that goes against the incision with your hands.  · No creams or ointments to the incision  · May remove dressing once the incision is free of drainage  · Do not touch or pick at the incision  · Check incision every day and notify surgeon immediately if any of the following signs or symptoms are noted:  ? Increase in redness  ? Increase in swelling around the incision and of the entire extremity  ? Increase in pain  ? Drainage oozing from the incision  ? Pulling apart of the edges of the incision  ? Increase in overall body temperature (greater than 100.5 degrees)  · Your surgeon will instruct you regarding suture or staple removal     IV. Medications:      1. Anticoagulants: You will be discharged on an anticoagulant. This is a prophylactic medication that helps prevent blood clots during your post-operative period. The type and length of dosage varies based on your individual needs, procedure performed, and surgeon’s preference.  · While taking the anticoagulant, you should avoid taking any additional aspirin, ibuprofen (Advil or Motrin), Aleve (Naprosyn) or other non-steroidal anti-inflammatory medications.   · Notify surgeon immediately if any chance bleeding is noted in the urine, stool, emesis, or from the nose or the incision. Blood in the stool will often appear as black rather than red. Blood in urine may appear as pink. Blood in emesis may appear as brown/black like coffee grounds.  · You will need to apply pressure for longer periods of time to any cuts or abrasions to stop bleeding  · Avoid alcohol while taking anticoagulants     2. Stool Softeners: You will be at greater risk of constipation after surgery due to being less mobile and the pain medications.    · Take stool softeners as instructed by your surgeon while on pain medications. Bran cereal is most effective. Over the counter Colace 100 mg 1-2 capsules twice daily.   · If stools become too loose or too frequent, please decreases the dosage or stop the stool softener.  · If constipation occurs despite use of stool softeners, you are to continue the stool softeners and add a laxative (Milk of Magnesia 1 ounce daily as needed)  · Drink plenty of fluids, and eat fruits and vegetables during your recovery time     3. Pain Medications utilized after surgery are narcotics and the law requires that the following information be given to all patients that are prescribed narcotics:  · CLASSIFICATION: Pain medications are called Opioids and are narcotics  · LEGALITIES: It is illegal to share narcotics with others and to drive within 24 hours of taking narcotics  · POTENTIAL SIDE EFFECTS: Potential side effects of opioids include: nausea, vomiting, itching, dizziness, drowsiness, dry mouth, constipation, and difficulty urinating.  · POTENTIAL ADVERSE EFFECTS:   ? Opioid tolerance can develop with use of pain medications and this simply means that it requires more and more of the medication to control pain; however, this is seen more in patients that use opioids for longer periods of time.  ? Opioid dependence can develop with use of Opioids and this simply means that to stop the medication can cause withdrawal symptoms; however, this is seen with patients that use Opioids for longer periods of time.  ? Opioid addiction can develop with use of Opioids and the incidence of this is very unlikely in patients who take the medications as ordered and stop the medications as instructed.  ? Opioid overdose can be dangerous, but is unlikely when the medication is taken as ordered and stopped when ordered. It is important not to mix opioids with alcohol or with and type of sedative such as Benadryl as this can lead to over sedation  and respiratory difficulty.  · DOSAGE:   ? Pain medications will need to be taken consistently for the first week to decrease pain and promote adequate pain relief and participation in physical therapy.  ? After the initial surgical pain begins to resolve, you may begin to decrease the pain medication. By the end of 6-8 weeks, you should be off of pain medications.  ? Refills will not be given by the office during evening hours, on weekends, or after 6-8 weeks post-op.  ? To seek refills on pain medications during the initial 6 week post-operative period, you must call the office 48 hours in advance to request the refill. The office will then notify you when to  the prescription. DO NOT wait until you are out of the medication to request a refill.     V. FOLLOW-UP VISITS:  · You will need to follow up in the office with your surgeon in 3 weeks. Please call this number [phone]  to schedule this appointment.  If you have any concerns or suspected complications prior to your follow up visit, please call your surgeons office. Do not wait until your appointment time if you suspect complications. These will need to be addressed in the office promptly.     Discharge Medications:     1) Percocet 5/325 1-2 po q 4-6 hours prn pain  2)  Enteric Coated Aspirin 325 mg po daily for 6 weeks.

## (undated) DEVICE — KT DRN EVAC WND PVC PCH WTROC RND 10F400

## (undated) DEVICE — ZIP 16 SURGICAL SKIN CLOSURE DEVICE, PSA: Brand: ZIP 16 SURGICAL SKIN CLOSURE DEVICE

## (undated) DEVICE — SUT VICRYL 1 CT1 27IN  JJ40G

## (undated) DEVICE — NDL SPINE 20G 3 1/2 YEL STRL 1P/U

## (undated) DEVICE — GAUZE,SPONGE,FLUFF,6"X6.75",STRL,10/TRAY: Brand: MEDLINE

## (undated) DEVICE — PK HIP TOTL 40

## (undated) DEVICE — ENCORE® LATEX ORTHO SIZE 8.5, STERILE LATEX POWDER-FREE SURGICAL GLOVE: Brand: ENCORE

## (undated) DEVICE — ANTIBACTERIAL UNDYED BRAIDED (POLYGLACTIN 910), SYNTHETIC ABSORBABLE SUTURE: Brand: COATED VICRYL

## (undated) DEVICE — BANDAGE,GAUZE,BULKEE II,4.5"X4.1YD,STRL: Brand: MEDLINE

## (undated) DEVICE — SYR LUERLOK 20CC

## (undated) DEVICE — DRSNG WND GZ CURAD OIL EMULSION 3X8IN LF STRL 1PK

## (undated) DEVICE — GLV SURG TRIUMPH CLASSIC PF LTX 8.5 STRL

## (undated) DEVICE — APPL DURAPREP IODOPHOR APL 26ML